# Patient Record
Sex: MALE | Race: WHITE | Employment: OTHER | URBAN - METROPOLITAN AREA
[De-identification: names, ages, dates, MRNs, and addresses within clinical notes are randomized per-mention and may not be internally consistent; named-entity substitution may affect disease eponyms.]

---

## 2017-09-28 ENCOUNTER — OFFICE VISIT (OUTPATIENT)
Dept: BEHAVIORAL/MENTAL HEALTH | Age: 47
End: 2017-09-28

## 2017-09-28 DIAGNOSIS — F32.A DEPRESSION, UNSPECIFIED DEPRESSION TYPE: ICD-10-CM

## 2017-09-28 DIAGNOSIS — F41.9 ANXIETY: ICD-10-CM

## 2017-09-28 PROCEDURE — 90791 PSYCH DIAGNOSTIC EVALUATION: CPT | Performed by: PSYCHOLOGIST

## 2017-09-28 ASSESSMENT — PATIENT HEALTH QUESTIONNAIRE - PHQ9
7. TROUBLE CONCENTRATING ON THINGS, SUCH AS READING THE NEWSPAPER OR WATCHING TELEVISION: 2
1. LITTLE INTEREST OR PLEASURE IN DOING THINGS: 1
3. TROUBLE FALLING OR STAYING ASLEEP: 3
8. MOVING OR SPEAKING SO SLOWLY THAT OTHER PEOPLE COULD HAVE NOTICED. OR THE OPPOSITE, BEING SO FIGETY OR RESTLESS THAT YOU HAVE BEEN MOVING AROUND A LOT MORE THAN USUAL: 3
9. THOUGHTS THAT YOU WOULD BE BETTER OFF DEAD, OR OF HURTING YOURSELF: 2
4. FEELING TIRED OR HAVING LITTLE ENERGY: 3
2. FEELING DOWN, DEPRESSED OR HOPELESS: 2
6. FEELING BAD ABOUT YOURSELF - OR THAT YOU ARE A FAILURE OR HAVE LET YOURSELF OR YOUR FAMILY DOWN: 2
10. IF YOU CHECKED OFF ANY PROBLEMS, HOW DIFFICULT HAVE THESE PROBLEMS MADE IT FOR YOU TO DO YOUR WORK, TAKE CARE OF THINGS AT HOME, OR GET ALONG WITH OTHER PEOPLE: 2
SUM OF ALL RESPONSES TO PHQ9 QUESTIONS 1 & 2: 3
5. POOR APPETITE OR OVEREATING: 2
SUM OF ALL RESPONSES TO PHQ QUESTIONS 1-9: 20

## 2017-10-10 ENCOUNTER — HOSPITAL ENCOUNTER (OUTPATIENT)
Dept: SLEEP CENTER | Age: 47
Discharge: HOME | End: 2017-10-10
Payer: COMMERCIAL

## 2017-10-10 PROCEDURE — 95811 POLYSOM 6/>YRS CPAP 4/> PARM: CPT

## 2019-05-24 ENCOUNTER — OFFICE VISIT (OUTPATIENT)
Dept: FAMILY MEDICINE CLINIC | Age: 49
End: 2019-05-24
Payer: COMMERCIAL

## 2019-05-24 VITALS
HEART RATE: 81 BPM | DIASTOLIC BLOOD PRESSURE: 78 MMHG | OXYGEN SATURATION: 96 % | HEIGHT: 70 IN | SYSTOLIC BLOOD PRESSURE: 130 MMHG | BODY MASS INDEX: 32.58 KG/M2 | WEIGHT: 227.6 LBS | TEMPERATURE: 97.3 F

## 2019-05-24 DIAGNOSIS — R73.9 HYPERGLYCEMIA: ICD-10-CM

## 2019-05-24 DIAGNOSIS — Z12.5 PROSTATE CANCER SCREENING: ICD-10-CM

## 2019-05-24 DIAGNOSIS — E03.9 HYPOTHYROIDISM, UNSPECIFIED TYPE: ICD-10-CM

## 2019-05-24 DIAGNOSIS — Z01.84 IMMUNITY STATUS TESTING: ICD-10-CM

## 2019-05-24 DIAGNOSIS — Z11.1 VISIT FOR TB SKIN TEST: ICD-10-CM

## 2019-05-24 DIAGNOSIS — Z00.00 WELLNESS EXAMINATION: Primary | ICD-10-CM

## 2019-05-24 DIAGNOSIS — Z00.00 PREVENTATIVE HEALTH CARE: ICD-10-CM

## 2019-05-24 DIAGNOSIS — Z23 NEED FOR TDAP VACCINATION: ICD-10-CM

## 2019-05-24 DIAGNOSIS — Z20.820 VARICELLA EXPOSURE: ICD-10-CM

## 2019-05-24 DIAGNOSIS — Z78.9 MEASLES, MUMPS, RUBELLA (MMR) VACCINATION STATUS UNKNOWN: ICD-10-CM

## 2019-05-24 LAB
ALBUMIN SERPL-MCNC: 4.7 G/DL (ref 3.5–4.6)
ALP BLD-CCNC: 54 U/L (ref 35–104)
ALT SERPL-CCNC: 24 U/L (ref 0–41)
ANION GAP SERPL CALCULATED.3IONS-SCNC: 14 MEQ/L (ref 9–15)
AST SERPL-CCNC: 35 U/L (ref 0–40)
BASOPHILS ABSOLUTE: 0 K/UL (ref 0–0.2)
BASOPHILS RELATIVE PERCENT: 0.4 %
BILIRUB SERPL-MCNC: 0.6 MG/DL (ref 0.2–0.7)
BUN BLDV-MCNC: 20 MG/DL (ref 6–20)
CALCIUM SERPL-MCNC: 9.6 MG/DL (ref 8.5–9.9)
CHLORIDE BLD-SCNC: 103 MEQ/L (ref 95–107)
CHOLESTEROL, TOTAL: 148 MG/DL (ref 0–199)
CO2: 25 MEQ/L (ref 20–31)
CREAT SERPL-MCNC: 1.29 MG/DL (ref 0.7–1.2)
EOSINOPHILS ABSOLUTE: 0 K/UL (ref 0–0.7)
EOSINOPHILS RELATIVE PERCENT: 0.8 %
GFR AFRICAN AMERICAN: >60
GFR NON-AFRICAN AMERICAN: 58.9
GLOBULIN: 2.9 G/DL (ref 2.3–3.5)
GLUCOSE BLD-MCNC: 94 MG/DL (ref 70–99)
HBA1C MFR BLD: 5.5 % (ref 4.8–5.9)
HBV SURFACE AB TITR SER: NORMAL MIU/ML
HCT VFR BLD CALC: 47.1 % (ref 42–52)
HDLC SERPL-MCNC: 48 MG/DL (ref 40–59)
HEMOGLOBIN: 16.2 G/DL (ref 14–18)
LDL CHOLESTEROL CALCULATED: 86 MG/DL (ref 0–129)
LYMPHOCYTES ABSOLUTE: 1.3 K/UL (ref 1–4.8)
LYMPHOCYTES RELATIVE PERCENT: 24 %
MCH RBC QN AUTO: 30.8 PG (ref 27–31.3)
MCHC RBC AUTO-ENTMCNC: 34.4 % (ref 33–37)
MCV RBC AUTO: 89.6 FL (ref 80–100)
MONOCYTES ABSOLUTE: 0.3 K/UL (ref 0.2–0.8)
MONOCYTES RELATIVE PERCENT: 6.5 %
NEUTROPHILS ABSOLUTE: 3.6 K/UL (ref 1.4–6.5)
NEUTROPHILS RELATIVE PERCENT: 68.3 %
PDW BLD-RTO: 13.6 % (ref 11.5–14.5)
PLATELET # BLD: 232 K/UL (ref 130–400)
POTASSIUM SERPL-SCNC: 4.5 MEQ/L (ref 3.4–4.9)
PROSTATE SPECIFIC ANTIGEN: 1.03 NG/ML (ref 0–3.89)
RBC # BLD: 5.26 M/UL (ref 4.7–6.1)
RUBELLA ANTIBODY IGG: 207.4 IU/ML
SODIUM BLD-SCNC: 142 MEQ/L (ref 135–144)
T3 TOTAL: 0.92 NG/ML (ref 0.8–2)
T4 FREE: 1.22 NG/DL (ref 0.84–1.68)
TOTAL PROTEIN: 7.6 G/DL (ref 6.3–8)
TRIGL SERPL-MCNC: 70 MG/DL (ref 0–150)
TSH SERPL DL<=0.05 MIU/L-ACNC: 1.49 UIU/ML (ref 0.44–3.86)
WBC # BLD: 5.2 K/UL (ref 4.8–10.8)

## 2019-05-24 PROCEDURE — 90471 IMMUNIZATION ADMIN: CPT | Performed by: NURSE PRACTITIONER

## 2019-05-24 PROCEDURE — 99396 PREV VISIT EST AGE 40-64: CPT | Performed by: NURSE PRACTITIONER

## 2019-05-24 PROCEDURE — 90715 TDAP VACCINE 7 YRS/> IM: CPT | Performed by: NURSE PRACTITIONER

## 2019-05-24 PROCEDURE — 86580 TB INTRADERMAL TEST: CPT | Performed by: NURSE PRACTITIONER

## 2019-05-24 RX ORDER — LEVOTHYROXINE SODIUM 0.12 MG/1
125 TABLET ORAL DAILY
Qty: 30 TABLET | Refills: 5 | Status: SHIPPED | OUTPATIENT
Start: 2019-05-24 | End: 2019-12-10 | Stop reason: SDUPTHER

## 2019-05-24 ASSESSMENT — ENCOUNTER SYMPTOMS
RHINORRHEA: 0
EYE REDNESS: 0
ABDOMINAL PAIN: 0
SORE THROAT: 0
NAUSEA: 0
SHORTNESS OF BREATH: 0
DIARRHEA: 0
COUGH: 0
TROUBLE SWALLOWING: 0
WHEEZING: 0
EYE ITCHING: 0
VOMITING: 0
BACK PAIN: 0
SINUS PRESSURE: 0
COLOR CHANGE: 0
CONSTIPATION: 0
EYE DISCHARGE: 0
CHEST TIGHTNESS: 0

## 2019-05-24 NOTE — PROGRESS NOTES
Subjective:      Patient ID: Nubia Calderon is a 47 y.o. male who presents today for:     Chief Complaint   Patient presents with    Medication Refill     Pt needs refill of Synthroid. 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 HPI Pt in today for physical for school and refill on synthroid medication. He reports that he has been on the same dose of synthroid for years and has been stable. It has been a year since his previous labs and he will need some done. He denies any constipation, fatigue, brittle nails, tremors, palpitations, etc. He has been out of medication for about 3 days though. He denies any other chronic conditions such as heart, lungs, or kidney. He denies any cp, palpitations, sob, dizziness, syncope, etc.     No past medical history on file. No past surgical history on file. No family history on file.   Social History     Socioeconomic History    Marital status:      Spouse name: Not on file    Number of children: Not on file    Years of education: Not on file    Highest education level: Not on file   Occupational History    Not on file   Social Needs    Financial resource strain: Not on file    Food insecurity:     Worry: Not on file     Inability: Not on file    Transportation needs:     Medical: Not on file     Non-medical: Not on file   Tobacco Use    Smoking status: Never Smoker    Smokeless tobacco: Current User   Substance and Sexual Activity    Alcohol use: Not on file    Drug use: Not on file    Sexual activity: Not on file   Lifestyle    Physical activity:     Days per week: Not on file     Minutes per session: Not on file    Stress: Not on file   Relationships    Social connections:     Talks on phone: Not on file     Gets together: Not on file     Attends Restorationist service: Not on file     Active member of club or organization: Not on file     Attends meetings of clubs or organizations: Not on file Relationship status: Not on file    Intimate partner violence:     Fear of current or ex partner: Not on file     Emotionally abused: Not on file     Physically abused: Not on file     Forced sexual activity: Not on file   Other Topics Concern    Not on file   Social History Narrative    Not on file     No current outpatient medications on file prior to visit. No current facility-administered medications on file prior to visit. Allergies:  Patient has no known allergies. Review of Systems   Constitutional: Negative for appetite change, chills, fatigue and fever. HENT: Negative for congestion, ear pain, hearing loss, postnasal drip, rhinorrhea, sinus pressure, sneezing, sore throat and trouble swallowing. Eyes: Negative for discharge, redness and itching. Respiratory: Negative for cough, chest tightness, shortness of breath and wheezing. Cardiovascular: Negative for chest pain and palpitations. Gastrointestinal: Negative for abdominal pain, constipation, diarrhea, nausea and vomiting. Genitourinary: Negative for difficulty urinating, dysuria and urgency. Musculoskeletal: Negative for back pain, gait problem and joint swelling. Skin: Negative for color change, pallor and rash. Neurological: Negative for dizziness, tremors, seizures, syncope, speech difficulty, light-headedness and headaches. Hematological: Negative for adenopathy. Objective:   /78 (Site: Right Upper Arm, Position: Sitting, Cuff Size: Medium Adult)   Pulse 81   Temp 97.3 °F (36.3 °C) (Temporal)   Ht 5' 9.5\" (1.765 m)   Wt 227 lb 9.6 oz (103.2 kg)   SpO2 96%   BMI 33.13 kg/m²     Physical Exam   Constitutional: He is oriented to person, place, and time. He appears well-developed and well-nourished. HENT:   Head: Normocephalic and atraumatic.    Right Ear: Tympanic membrane, external ear and ear canal normal.   Left Ear: Tympanic membrane, external ear and ear canal normal.   Nose: Nose normal. Mouth/Throat: Uvula is midline, oropharynx is clear and moist and mucous membranes are normal.   Eyes: Pupils are equal, round, and reactive to light. Conjunctivae are normal. Right eye exhibits no discharge. Left eye exhibits no discharge. Neck: Normal range of motion. Neck supple. Carotid bruit is not present. No thyroid mass and no thyromegaly present. Cardiovascular: Normal rate, regular rhythm and normal heart sounds. Exam reveals no gallop and no friction rub. No murmur heard. Pulmonary/Chest: Effort normal and breath sounds normal. No respiratory distress. Abdominal: Soft. Bowel sounds are normal. There is no tenderness. Musculoskeletal: Normal range of motion. Lymphadenopathy:     He has no cervical adenopathy. Neurological: He is alert and oriented to person, place, and time. He has normal strength and normal reflexes. No cranial nerve deficit. Reflex Scores:       Patellar reflexes are 2+ on the right side and 2+ on the left side. Skin: Skin is warm and dry. Psychiatric: He has a normal mood and affect. His speech is normal and behavior is normal.       Assessment:          Diagnosis Orders   1. Wellness examination     2. Hyperglycemia  Comprehensive Metabolic Panel    CBC Auto Differential    Hemoglobin A1C   3. Preventative health care  Comprehensive Metabolic Panel    CBC Auto Differential    Lipid Panel   4. Hypothyroidism, unspecified type  levothyroxine (SYNTHROID) 125 MCG tablet    TSH without Reflex    T4, Free    T3   5. Prostate cancer screening  Psa screening   6. Measles, mumps, rubella (MMR) vaccination status unknown  Rubella antibody, IgG   7. Varicella exposure  Varicella Zoster Antibody, IgG   8. Immunity status testing     9. Need for Tdap vaccination  Tdap (age 6y and older)  ("[employer] Extension)   8.  Visit for TB skin test  Mantoux testing       Plan:      Orders Placed This Encounter   Procedures    Tdap (age 6y and older)  ("[employer] Extension)    Mantoux testing    Comprehensive Metabolic Panel     Standing Status:   Future     Number of Occurrences:   1     Standing Expiration Date:   5/23/2020    CBC Auto Differential     Standing Status:   Future     Number of Occurrences:   1     Standing Expiration Date:   5/23/2020    TSH without Reflex     Standing Status:   Future     Number of Occurrences:   1     Standing Expiration Date:   5/23/2020    T4, Free     Standing Status:   Future     Number of Occurrences:   1     Standing Expiration Date:   5/24/2020    T3     Standing Status:   Future     Number of Occurrences:   1     Standing Expiration Date:   5/24/2020    Hemoglobin A1C     Standing Status:   Future     Number of Occurrences:   1     Standing Expiration Date:   5/23/2020    Lipid Panel     Standing Status:   Future     Number of Occurrences:   1     Standing Expiration Date:   5/23/2020     Order Specific Question:   Is Patient Fasting?/# of Hours     Answer:   10    Psa screening     Standing Status:   Future     Number of Occurrences:   1     Standing Expiration Date:   5/24/2020    Rubella antibody, IgG     Standing Status:   Future     Number of Occurrences:   1     Standing Expiration Date:   5/23/2020    Varicella Zoster Antibody, IgG     Standing Status:   Future     Number of Occurrences:   1     Standing Expiration Date:   5/23/2020          Orders Placed This Encounter   Medications    levothyroxine (SYNTHROID) 125 MCG tablet     Sig: Take 1 tablet by mouth Daily     Dispense:  30 tablet     Refill:  5       Return in about 6 months (around 11/24/2019). Preventative- lab work. F/u pending results. Vaccination status- titers  Hyperglycemia- check hgb a1c as his last few BG have been elevated. Hypothyroidism- recheck tsh and continue current synthroid dose. Reviewed with the patient: current clinicalstatus, medications, activities and diet.      Side effects, adverse effects of the medication prescribedtoday, as well as treatment plan/ rationale and result expectations have been discussedwith the patient who expresses understanding and desires to proceed. Close follow upto evaluate treatment results and for coordination of care. I have reviewedthe patient's medical history in detail and updated the computerized patient record.     Reji Levine, APRN - CNP

## 2019-05-24 NOTE — PATIENT INSTRUCTIONS
Patient Education        Hypothyroidism: Care Instructions  Your Care Instructions    You have hypothyroidism, which means that your body is not making enough thyroid hormone. This hormone helps your body use energy. If your thyroid level is low, you may feel tired, be constipated, have an increase in your blood pressure, or have dry skin or memory problems. You may also get cold easily, even when it is warm. Women with low thyroid levels may have heavy menstrual periods. A blood test to find your thyroid-stimulating hormone (TSH) level is used to check for hypothyroidism. A high TSH level may mean that you have low thyroid. When your body is not making enough thyroid hormone, TSH levels rise in an effort to make the body produce more. The treatment for hypothyroidism is to take thyroid hormone pills. You should start to feel better in 1 to 2 weeks. But it can take several months to see changes in the TSH level. You will need regular visits with your doctor to make sure you have the right dose of medicine. Most people need treatment for the rest of their lives. You will need to see your doctor regularly to have blood tests and to make sure you are doing well. Follow-up care is a key part of your treatment and safety. Be sure to make and go to all appointments, and call your doctor if you are having problems. It's also a good idea to know your test results and keep a list of the medicines you take. How can you care for yourself at home? · Take your thyroid hormone medicine exactly as prescribed. Call your doctor if you think you are having a problem with your medicine. Most people do not have side effects if they take the right amount of medicine regularly. ? Take the medicine 30 minutes before breakfast, and do not take it with calcium, vitamins, or iron. ? Do not take extra doses of your thyroid medicine. It will not help you get better any faster, and it may cause side effects.   ? If you forget to take a dose, do NOT take a double dose of medicine. Take your usual dose the next day. · Tell your doctor about all prescription, herbal, or over-the-counter products you take. · Take care of yourself. Eat a healthy diet, get enough sleep, and get regular exercise. When should you call for help? Call 911 anytime you think you may need emergency care. For example, call if:    · You passed out (lost consciousness).     · You have severe trouble breathing.     · You have a very slow heartbeat (less than 60 beats a minute).     · You have a low body temperature (95°F or below).    Call your doctor now or seek immediate medical care if:    · You feel tired, sluggish, or weak.     · You have trouble remembering things or concentrating.     · You do not begin to feel better 2 weeks after starting your medicine.    Watch closely for changes in your health, and be sure to contact your doctor if you have any problems. Where can you learn more? Go to https://lynda.com.[employer]. org and sign in to your [employer] account. Enter V280 in the [employer] box to learn more about \"Hypothyroidism: Care Instructions. \"     If you do not have an account, please click on the \"Sign Up Now\" link. Current as of: November 6, 2018  Content Version: 12.0  © 3482-7841 [employer], Inc[employer]. Care instructions adapted under license by [facility] ([facility]). If you have questions about a medical condition or this instruction, always ask your healthcare professional. [place] [address] any warranty or liability for your use of this information.

## 2019-05-27 LAB
MUV IGG SER QL: 178 AU/ML
RUBEOLA (MEASLES) AB IGG: 284 AU/ML
VZV IGG SER QL IA: 1304 IV

## 2019-05-28 ENCOUNTER — TELEPHONE (OUTPATIENT)
Dept: FAMILY MEDICINE CLINIC | Age: 49
End: 2019-05-28

## 2019-05-28 ENCOUNTER — NURSE ONLY (OUTPATIENT)
Dept: FAMILY MEDICINE CLINIC | Age: 49
End: 2019-05-28
Payer: COMMERCIAL

## 2019-05-28 DIAGNOSIS — Z23 NEED FOR HEPATITIS B VACCINATION: Primary | ICD-10-CM

## 2019-05-28 PROCEDURE — 90471 IMMUNIZATION ADMIN: CPT | Performed by: NURSE PRACTITIONER

## 2019-05-28 PROCEDURE — 90746 HEPB VACCINE 3 DOSE ADULT IM: CPT | Performed by: NURSE PRACTITIONER

## 2019-05-28 NOTE — TELEPHONE ENCOUNTER
Spoke to pt can not have one for 7 days he is aware of this how ever went to [facility] and had done. He is aware this can cause fasle positive he will be in for hep b shot.

## 2019-05-29 LAB
ALBUMIN SERPL-MCNC: 4.5 G/DL (ref 3.5–4.6)
ALP BLD-CCNC: 62 U/L (ref 35–104)
ALT SERPL-CCNC: 32 U/L (ref 0–41)
AST SERPL-CCNC: 40 U/L (ref 0–40)
BILIRUB SERPL-MCNC: 0.3 MG/DL (ref 0.2–0.7)
BILIRUBIN DIRECT: <0.2 MG/DL (ref 0–0.4)
BILIRUBIN, INDIRECT: NORMAL MG/DL (ref 0–0.6)
TOTAL PROTEIN: 7.6 G/DL (ref 6.3–8)

## 2019-07-12 ENCOUNTER — OFFICE VISIT (OUTPATIENT)
Dept: FAMILY MEDICINE CLINIC | Age: 49
End: 2019-07-12
Payer: COMMERCIAL

## 2019-07-12 VITALS
WEIGHT: 226.2 LBS | OXYGEN SATURATION: 96 % | HEART RATE: 81 BPM | DIASTOLIC BLOOD PRESSURE: 72 MMHG | HEIGHT: 70 IN | TEMPERATURE: 97.1 F | SYSTOLIC BLOOD PRESSURE: 126 MMHG | BODY MASS INDEX: 32.38 KG/M2

## 2019-07-12 DIAGNOSIS — R79.89 ELEVATED SERUM CREATININE: ICD-10-CM

## 2019-07-12 DIAGNOSIS — Z23 NEED FOR HEPATITIS B VACCINATION: ICD-10-CM

## 2019-07-12 DIAGNOSIS — Z12.11 COLON CANCER SCREENING: ICD-10-CM

## 2019-07-12 DIAGNOSIS — R79.89 ELEVATED SERUM CREATININE: Primary | ICD-10-CM

## 2019-07-12 DIAGNOSIS — R37 SEXUAL DYSFUNCTION: ICD-10-CM

## 2019-07-12 DIAGNOSIS — F41.9 ANXIETY: ICD-10-CM

## 2019-07-12 DIAGNOSIS — R00.2 PALPITATIONS: ICD-10-CM

## 2019-07-12 LAB
ALBUMIN SERPL-MCNC: 4.9 G/DL (ref 3.5–4.6)
ALP BLD-CCNC: 64 U/L (ref 35–104)
ALT SERPL-CCNC: 34 U/L (ref 0–41)
ANION GAP SERPL CALCULATED.3IONS-SCNC: 16 MEQ/L (ref 9–15)
AST SERPL-CCNC: 36 U/L (ref 0–40)
BILIRUB SERPL-MCNC: 0.7 MG/DL (ref 0.2–0.7)
BUN BLDV-MCNC: 20 MG/DL (ref 6–20)
CALCIUM SERPL-MCNC: 10.3 MG/DL (ref 8.5–9.9)
CHLORIDE BLD-SCNC: 100 MEQ/L (ref 95–107)
CO2: 26 MEQ/L (ref 20–31)
CREAT SERPL-MCNC: 1.2 MG/DL (ref 0.7–1.2)
GFR AFRICAN AMERICAN: >60
GFR NON-AFRICAN AMERICAN: >60
GLOBULIN: 3.3 G/DL (ref 2.3–3.5)
GLUCOSE BLD-MCNC: 76 MG/DL (ref 70–99)
POTASSIUM SERPL-SCNC: 4.7 MEQ/L (ref 3.4–4.9)
SODIUM BLD-SCNC: 142 MEQ/L (ref 135–144)
TOTAL PROTEIN: 8.2 G/DL (ref 6.3–8)

## 2019-07-12 PROCEDURE — G8427 DOCREV CUR MEDS BY ELIG CLIN: HCPCS | Performed by: NURSE PRACTITIONER

## 2019-07-12 PROCEDURE — 99214 OFFICE O/P EST MOD 30 MIN: CPT | Performed by: NURSE PRACTITIONER

## 2019-07-12 PROCEDURE — 3017F COLORECTAL CA SCREEN DOC REV: CPT | Performed by: NURSE PRACTITIONER

## 2019-07-12 PROCEDURE — 90746 HEPB VACCINE 3 DOSE ADULT IM: CPT | Performed by: NURSE PRACTITIONER

## 2019-07-12 PROCEDURE — 4004F PT TOBACCO SCREEN RCVD TLK: CPT | Performed by: NURSE PRACTITIONER

## 2019-07-12 PROCEDURE — 90471 IMMUNIZATION ADMIN: CPT | Performed by: NURSE PRACTITIONER

## 2019-07-12 PROCEDURE — G8417 CALC BMI ABV UP PARAM F/U: HCPCS | Performed by: NURSE PRACTITIONER

## 2019-07-12 NOTE — PROGRESS NOTES
 Drug use: Not on file    Sexual activity: Not on file   Lifestyle    Physical activity:     Days per week: Not on file     Minutes per session: Not on file    Stress: Not on file   Relationships    Social connections:     Talks on phone: Not on file     Gets together: Not on file     Attends Scientology service: Not on file     Active member of club or organization: Not on file     Attends meetings of clubs or organizations: Not on file     Relationship status: Not on file    Intimate partner violence:     Fear of current or ex partner: Not on file     Emotionally abused: Not on file     Physically abused: Not on file     Forced sexual activity: Not on file   Other Topics Concern    Not on file   Social History Narrative    Not on file     Current Outpatient Medications on File Prior to Visit   Medication Sig Dispense Refill    levothyroxine (SYNTHROID) 125 MCG tablet Take 1 tablet by mouth Daily 30 tablet 5     No current facility-administered medications on file prior to visit. Allergies:  Patient has no known allergies. Review of Systems   Constitutional: Negative for chills, fatigue and fever. Respiratory: Negative for cough, shortness of breath and wheezing. Cardiovascular: Positive for palpitations. Negative for chest pain and leg swelling. Gastrointestinal: Negative for abdominal pain. Genitourinary: Negative for discharge, enuresis, flank pain, frequency, genital sores, penile pain, penile swelling, scrotal swelling and testicular pain. Psychiatric/Behavioral: Negative for self-injury, sleep disturbance and suicidal ideas. The patient is nervous/anxious. Objective:   /72 (Site: Left Upper Arm, Position: Sitting, Cuff Size: Large Adult)   Pulse 81   Temp 97.1 °F (36.2 °C) (Temporal)   Ht 5' 9.5\" (1.765 m)   Wt 226 lb 3.2 oz (102.6 kg)   SpO2 96%   BMI 32.92 kg/m²     Physical Exam   Constitutional: He is oriented to person, place, and time.  He appears Standing Status:   Future     Number of Occurrences:   1     Standing Expiration Date:   7/11/2020    Testosterone Free And Total Male     Standing Status:   Future     Number of Occurrences:   1     Standing Expiration Date:   7/12/2020        No orders of the defined types were placed in this encounter. Return in about 4 months (around 11/12/2019). Colon cancer screening- cologuard  Sexual dysfunction- testosterone lab  Elevated creatinine- hydrate and recheck cmp  Palpitations- start with stress test. F/u pending results and if it continues. Consider cardio referral next. Anxiety- Pt has need for specialized testing environment because of anxiety related to past experiences that has mostly resolved, but has been increasing lately with going back to school. Reviewed with the patient: current clinicalstatus, medications, activities and diet. Side effects, adverse effects of the medication prescribedtoday, as well as treatment plan/ rationale and result expectations have been discussedwith the patient who expresses understanding and desires to proceed. Close follow upto evaluate treatment results and for coordination of care. I have reviewedthe patient's medical history in detail and updated the computerized patient record.     Henny Michelle, VICKIE - CNP

## 2019-07-12 NOTE — PATIENT INSTRUCTIONS
also may want to do other activities, such as running, swimming, cycling, or playing tennis or team sports. ? Go to bed at nearly the same time every night. And keep your room quiet and dark. This will reduce distractions and help you get a good night's rest.  ? Eat a balanced diet by choosing foods low in fat and high in fiber. ? Avoid food and drinks that contain caffeine, such as chocolate and coffee. Caffeine may make your phobia worse. ? Try some relaxation exercises. Certain breathing exercises and muscle relaxation exercises help reduce anxiety. · Stay active. Try to do the things you usually enjoy doing, even if you don't feel like doing them. · Discuss the cause of your fears with a good friend or family member. Or join a support group for people with problems like yours. Sharing feelings with others sometimes relieves fear. · When you start to feel fearful, do something to get your mind off it, such as taking a walk. · Trust that you can improve your way of coping with these fears. You can feel better. What should you do if someone in your family has a phobia? · Learn about the phobia and signs that symptoms are getting worse. · Remind your family member of your love. Speak honestly with him or her. · Make a plan with all family members about how to take care of your loved one when his or her fears are bad. Talk about your concerns and those of other family members. · Do not focus attention only on the family member who is in treatment. · Remind yourself that it will take time for changes to occur. · Do not blame yourself for his or her condition. · Know your legal rights and the legal rights of your family member. · Take care of yourself. Stay involved with your own interests, such as your career, hobbies, and friends. · Use exercise, positive self-talk, relaxation, and deep breathing exercises to help lower your stress.   · If you are having a hard time with your feelings and your

## 2019-07-15 LAB
SEX HORMONE BINDING GLOBULIN: 43 NMOL/L (ref 11–80)
TESTOSTERONE FREE PERCENT: 1.6 % (ref 1.6–2.9)
TESTOSTERONE FREE, CALC: 65 PG/ML (ref 47–244)
TESTOSTERONE TOTAL-MALE: 411 NG/DL (ref 300–890)

## 2019-07-22 ASSESSMENT — ENCOUNTER SYMPTOMS
WHEEZING: 0
COUGH: 0
SHORTNESS OF BREATH: 0
ABDOMINAL PAIN: 0

## 2019-08-15 ENCOUNTER — OFFICE VISIT (OUTPATIENT)
Dept: FAMILY MEDICINE CLINIC | Age: 49
End: 2019-08-15
Payer: COMMERCIAL

## 2019-08-15 VITALS
HEART RATE: 80 BPM | DIASTOLIC BLOOD PRESSURE: 76 MMHG | OXYGEN SATURATION: 97 % | WEIGHT: 228.6 LBS | SYSTOLIC BLOOD PRESSURE: 130 MMHG | TEMPERATURE: 97 F | HEIGHT: 70 IN | BODY MASS INDEX: 32.73 KG/M2

## 2019-08-15 DIAGNOSIS — R53.83 FATIGUE, UNSPECIFIED TYPE: Primary | ICD-10-CM

## 2019-08-15 DIAGNOSIS — R68.82 LOW LIBIDO: ICD-10-CM

## 2019-08-15 DIAGNOSIS — E29.1 HYPOTESTOSTERONEMIA IN MALE: ICD-10-CM

## 2019-08-15 PROBLEM — E03.9 HYPOTHYROIDISM: Status: ACTIVE | Noted: 2019-08-15

## 2019-08-15 PROCEDURE — G8427 DOCREV CUR MEDS BY ELIG CLIN: HCPCS | Performed by: NURSE PRACTITIONER

## 2019-08-15 PROCEDURE — 4004F PT TOBACCO SCREEN RCVD TLK: CPT | Performed by: NURSE PRACTITIONER

## 2019-08-15 PROCEDURE — G8417 CALC BMI ABV UP PARAM F/U: HCPCS | Performed by: NURSE PRACTITIONER

## 2019-08-15 PROCEDURE — 99213 OFFICE O/P EST LOW 20 MIN: CPT | Performed by: NURSE PRACTITIONER

## 2019-08-15 PROCEDURE — 3017F COLORECTAL CA SCREEN DOC REV: CPT | Performed by: NURSE PRACTITIONER

## 2019-08-15 RX ORDER — TESTOSTERONE CYPIONATE 200 MG/ML
200 INJECTION INTRAMUSCULAR ONCE
Qty: 1 ML | Refills: 0 | Status: SHIPPED | OUTPATIENT
Start: 2019-08-15 | End: 2019-09-02 | Stop reason: SDUPTHER

## 2019-08-15 ASSESSMENT — ENCOUNTER SYMPTOMS
WHEEZING: 0
COUGH: 0
SHORTNESS OF BREATH: 0

## 2019-08-15 NOTE — PATIENT INSTRUCTIONS
possible side effects of testosterone injection? Get emergency medical help if you have signs of an allergic reaction:  hives; difficult breathing; swelling of your face, lips, tongue, or throat. Call your doctor at once if you have:  · nausea or vomiting;  · changes in skin color;  · increased or ongoing erection of the penis;  · impotence, ejaculation problems, decreased amounts of semen, decrease in testicle size;  · painful or difficult urination;  · shortness of breath (even with mild exertion);  · chest pain or pressure, pain spreading to your jaw or shoulder;  · swelling in your ankles or feet, rapid weight gain;  · signs of a blood clot in the lung --chest pain, sudden cough, wheezing, rapid breathing, coughing up blood;  · signs of a blood clot in your leg --pain, swelling, warmth, or redness in one or both legs;  · high levels of calcium in the blood --stomach pain, constipation, increased thirst or urination, muscle pain or weakness, joint pain, confusion, and feeling tired or restless; or  · liver problems --upper stomach pain, itching, loss of appetite, dark urine, amara-colored stools, jaundice (yellowing of the skin or eyes). Women receiving testosterone may develop male characteristics, which could be irreversible if treatment is continued. Call your doctor at once if you notice any of these signs of excess testosterone:  · acne;  · changes in menstrual periods;  · male-pattern hair growth (such as on the chin or chest);  · hoarse or deepened voice; or  · enlarged clitoris. Common side effects (in men or women) may include:  · breast swelling;  · headache, anxiety;  · increased facial or body hair growth, male-pattern baldness;  · increased or decreased interest in sex;  · numbness or tingly feeling; or  · pain or swelling where the medicine was injected. This is not a complete list of side effects and others may occur. Call your doctor for medical advice about side effects.  You may report side

## 2019-08-15 NOTE — PROGRESS NOTES
injection   2. Low libido  testosterone cypionate (DEPOTESTOTERONE CYPIONATE) 200 MG/ML injection   3. Hypotestosteronemia in male  testosterone cypionate (DEPOTESTOTERONE CYPIONATE) 200 MG/ML injection       Plan:      No orders of the defined types were placed in this encounter. Orders Placed This Encounter   Medications    testosterone cypionate (DEPOTESTOTERONE CYPIONATE) 200 MG/ML injection     Sig: Inject 1 mL into the muscle once for 1 dose. Dispense:  1 mL     Refill:  0       Return in about 3 months (around 11/15/2019). Discussed with patient that due to testosterone being low normal insurance may not cover medication. He verbalized understanding and would still like to start medication. 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 He was agreeable. Will start at 200mg every 3 weeks and retest between the second and third dose. Reviewed with the patient: current clinicalstatus, medications, activities and diet. Side effects, adverse effects of the medication prescribedtoday, as well as treatment plan/ rationale and result expectations have been discussedwith the patient who expresses understanding and desires to proceed. Close follow upto evaluate treatment results and for coordination of care. I have reviewedthe patient's medical history in detail and updated the computerized patient record.     VICKIE Das Cera - CNP

## 2019-09-02 DIAGNOSIS — E29.1 HYPOTESTOSTERONEMIA IN MALE: ICD-10-CM

## 2019-09-02 DIAGNOSIS — R68.82 LOW LIBIDO: ICD-10-CM

## 2019-09-02 DIAGNOSIS — R53.83 FATIGUE, UNSPECIFIED TYPE: ICD-10-CM

## 2019-09-03 RX ORDER — TESTOSTERONE CYPIONATE 200 MG/ML
INJECTION INTRAMUSCULAR
Qty: 1 ML | Refills: 0 | Status: SHIPPED | OUTPATIENT
Start: 2019-09-03 | End: 2019-09-28 | Stop reason: SDUPTHER

## 2019-09-27 DIAGNOSIS — E03.9 HYPOTHYROIDISM, UNSPECIFIED TYPE: ICD-10-CM

## 2019-09-27 DIAGNOSIS — E29.1 HYPOTESTOSTERONEMIA IN MALE: ICD-10-CM

## 2019-09-27 DIAGNOSIS — R53.83 FATIGUE, UNSPECIFIED TYPE: ICD-10-CM

## 2019-09-27 DIAGNOSIS — R68.82 LOW LIBIDO: ICD-10-CM

## 2019-09-27 RX ORDER — TESTOSTERONE CYPIONATE 200 MG/ML
INJECTION INTRAMUSCULAR
Qty: 1 ML | Refills: 0 | Status: CANCELLED | OUTPATIENT
Start: 2019-09-27 | End: 2019-10-18

## 2019-09-27 RX ORDER — LEVOTHYROXINE SODIUM 0.12 MG/1
125 TABLET ORAL DAILY
Qty: 30 TABLET | Refills: 5 | Status: CANCELLED | OUTPATIENT
Start: 2019-09-27

## 2019-09-28 DIAGNOSIS — E29.1 HYPOTESTOSTERONEMIA IN MALE: ICD-10-CM

## 2019-09-28 DIAGNOSIS — R68.82 LOW LIBIDO: ICD-10-CM

## 2019-09-28 DIAGNOSIS — R53.83 FATIGUE, UNSPECIFIED TYPE: ICD-10-CM

## 2019-09-30 DIAGNOSIS — E29.1 HYPOTESTOSTERONEMIA IN MALE: Primary | ICD-10-CM

## 2019-10-01 DIAGNOSIS — E29.1 HYPOTESTOSTERONEMIA IN MALE: ICD-10-CM

## 2019-10-01 RX ORDER — TESTOSTERONE CYPIONATE 200 MG/ML
INJECTION INTRAMUSCULAR
Qty: 1 ML | Refills: 0 | Status: SHIPPED | OUTPATIENT
Start: 2019-10-01 | End: 2019-10-17 | Stop reason: SDUPTHER

## 2019-10-03 LAB
SEX HORMONE BINDING GLOBULIN: 34 NMOL/L (ref 11–80)
TESTOSTERONE FREE PERCENT: 1.8 % (ref 1.6–2.9)
TESTOSTERONE FREE, CALC: 72 PG/ML (ref 47–244)
TESTOSTERONE TOTAL-MALE: 399 NG/DL (ref 300–890)

## 2019-10-15 ENCOUNTER — NURSE ONLY (OUTPATIENT)
Dept: FAMILY MEDICINE CLINIC | Age: 49
End: 2019-10-15
Payer: COMMERCIAL

## 2019-10-15 DIAGNOSIS — Z23 NEED FOR INFLUENZA VACCINATION: Primary | ICD-10-CM

## 2019-10-15 PROCEDURE — 90688 IIV4 VACCINE SPLT 0.5 ML IM: CPT | Performed by: NURSE PRACTITIONER

## 2019-10-15 PROCEDURE — 90471 IMMUNIZATION ADMIN: CPT | Performed by: NURSE PRACTITIONER

## 2019-10-17 DIAGNOSIS — R68.82 LOW LIBIDO: ICD-10-CM

## 2019-10-17 DIAGNOSIS — R53.83 FATIGUE, UNSPECIFIED TYPE: ICD-10-CM

## 2019-10-17 DIAGNOSIS — E29.1 HYPOTESTOSTERONEMIA IN MALE: ICD-10-CM

## 2019-10-17 DIAGNOSIS — M25.50 ARTHRALGIA, UNSPECIFIED JOINT: Primary | ICD-10-CM

## 2019-10-17 RX ORDER — TESTOSTERONE CYPIONATE 200 MG/ML
INJECTION INTRAMUSCULAR
Qty: 1 ML | Refills: 2 | Status: SHIPPED | OUTPATIENT
Start: 2019-10-17 | End: 2019-11-25 | Stop reason: SDUPTHER

## 2019-10-17 RX ORDER — IBUPROFEN AND FAMOTIDINE 26.6; 8 MG/1; MG/1
1 TABLET, FILM COATED ORAL 3 TIMES DAILY
Qty: 90 TABLET | Refills: 0 | Status: SHIPPED | OUTPATIENT
Start: 2019-10-17 | End: 2019-12-10 | Stop reason: SDUPTHER

## 2019-11-20 ENCOUNTER — PATIENT MESSAGE (OUTPATIENT)
Dept: FAMILY MEDICINE CLINIC | Age: 49
End: 2019-11-20

## 2019-11-20 DIAGNOSIS — E29.1 HYPOTESTOSTERONEMIA IN MALE: ICD-10-CM

## 2019-11-20 DIAGNOSIS — R68.82 LOW LIBIDO: ICD-10-CM

## 2019-11-20 DIAGNOSIS — R53.83 FATIGUE, UNSPECIFIED TYPE: ICD-10-CM

## 2019-11-25 RX ORDER — TESTOSTERONE CYPIONATE 200 MG/ML
INJECTION INTRAMUSCULAR
Qty: 2 ML | Refills: 5 | Status: SHIPPED | OUTPATIENT
Start: 2019-11-25 | End: 2020-06-08

## 2019-12-10 DIAGNOSIS — M25.50 ARTHRALGIA, UNSPECIFIED JOINT: ICD-10-CM

## 2019-12-10 DIAGNOSIS — E03.9 HYPOTHYROIDISM, UNSPECIFIED TYPE: ICD-10-CM

## 2019-12-10 RX ORDER — IBUPROFEN AND FAMOTIDINE 26.6; 8 MG/1; MG/1
1 TABLET, FILM COATED ORAL 3 TIMES DAILY
Qty: 90 TABLET | Refills: 0 | Status: SHIPPED | OUTPATIENT
Start: 2019-12-10 | End: 2020-02-07 | Stop reason: SDUPTHER

## 2019-12-10 RX ORDER — LEVOTHYROXINE SODIUM 0.12 MG/1
125 TABLET ORAL DAILY
Qty: 30 TABLET | Refills: 5 | Status: SHIPPED | OUTPATIENT
Start: 2019-12-10 | End: 2020-06-07 | Stop reason: SDUPTHER

## 2019-12-24 ENCOUNTER — NURSE ONLY (OUTPATIENT)
Dept: FAMILY MEDICINE CLINIC | Age: 49
End: 2019-12-24
Payer: COMMERCIAL

## 2019-12-24 DIAGNOSIS — Z23 NEED FOR HEPATITIS B VACCINATION: Primary | ICD-10-CM

## 2019-12-24 PROCEDURE — 90471 IMMUNIZATION ADMIN: CPT | Performed by: NURSE PRACTITIONER

## 2019-12-24 PROCEDURE — 90746 HEPB VACCINE 3 DOSE ADULT IM: CPT | Performed by: NURSE PRACTITIONER

## 2020-01-23 ENCOUNTER — PATIENT MESSAGE (OUTPATIENT)
Dept: FAMILY MEDICINE CLINIC | Age: 50
End: 2020-01-23

## 2020-01-24 RX ORDER — BUSPIRONE HYDROCHLORIDE 5 MG/1
5 TABLET ORAL 3 TIMES DAILY PRN
Qty: 90 TABLET | Refills: 1 | Status: SHIPPED | OUTPATIENT
Start: 2020-01-24 | End: 2020-12-11

## 2020-01-24 RX ORDER — PHENOL 1.4 %
1 AEROSOL, SPRAY (ML) MUCOUS MEMBRANE NIGHTLY PRN
Qty: 30 TABLET | Refills: 3 | Status: SHIPPED | OUTPATIENT
Start: 2020-01-24 | End: 2020-09-10

## 2020-01-24 NOTE — TELEPHONE ENCOUNTER
From: Kodi Zee  To:  VICKIE Brewer - CNP  Sent: 1/23/2020 10:52 AM EST  Subject: Prescription Question    Anxiety and sleep issues reminder

## 2020-02-04 RX ORDER — ESCITALOPRAM OXALATE 10 MG/1
10 TABLET ORAL DAILY
Qty: 30 TABLET | Refills: 5 | Status: SHIPPED | OUTPATIENT
Start: 2020-02-04 | End: 2020-12-11

## 2020-02-06 ENCOUNTER — APPOINTMENT (OUTPATIENT)
Dept: CT IMAGING | Age: 50
End: 2020-02-06
Payer: COMMERCIAL

## 2020-02-06 ENCOUNTER — HOSPITAL ENCOUNTER (EMERGENCY)
Age: 50
Discharge: HOME | End: 2020-02-06
Attending: EMERGENCY MEDICINE
Payer: COMMERCIAL

## 2020-02-06 VITALS
HEIGHT: 69 IN | WEIGHT: 230 LBS | BODY MASS INDEX: 34.07 KG/M2 | SYSTOLIC BLOOD PRESSURE: 170 MMHG | OXYGEN SATURATION: 95 % | DIASTOLIC BLOOD PRESSURE: 80 MMHG | TEMPERATURE: 99 F | RESPIRATION RATE: 17 BRPM | HEART RATE: 95 BPM

## 2020-02-06 PROCEDURE — 99284 EMERGENCY DEPT VISIT MOD MDM: CPT

## 2020-02-06 PROCEDURE — 70486 CT MAXILLOFACIAL W/O DYE: CPT

## 2020-02-06 PROCEDURE — 6360000002 HC RX W HCPCS: Performed by: EMERGENCY MEDICINE

## 2020-02-06 PROCEDURE — 70450 CT HEAD/BRAIN W/O DYE: CPT

## 2020-02-06 PROCEDURE — 96374 THER/PROPH/DIAG INJ IV PUSH: CPT

## 2020-02-06 PROCEDURE — 6370000000 HC RX 637 (ALT 250 FOR IP)

## 2020-02-06 PROCEDURE — 6370000000 HC RX 637 (ALT 250 FOR IP): Performed by: EMERGENCY MEDICINE

## 2020-02-06 RX ORDER — DIAPER,BRIEF,INFANT-TODD,DISP
EACH MISCELLANEOUS ONCE
Status: COMPLETED | OUTPATIENT
Start: 2020-02-06 | End: 2020-02-06

## 2020-02-06 RX ORDER — IBUPROFEN 400 MG/1
800 TABLET ORAL ONCE
Status: COMPLETED | OUTPATIENT
Start: 2020-02-06 | End: 2020-02-06

## 2020-02-06 RX ORDER — KETOROLAC TROMETHAMINE 30 MG/ML
60 INJECTION, SOLUTION INTRAMUSCULAR; INTRAVENOUS ONCE
Status: COMPLETED | OUTPATIENT
Start: 2020-02-06 | End: 2020-02-06

## 2020-02-06 RX ADMIN — BACITRACIN ZINC: 500 OINTMENT TOPICAL at 20:37

## 2020-02-06 RX ADMIN — KETOROLAC TROMETHAMINE 60 MG: 30 INJECTION, SOLUTION INTRAMUSCULAR at 20:52

## 2020-02-06 RX ADMIN — IBUPROFEN 800 MG: 400 TABLET, FILM COATED ORAL at 20:37

## 2020-02-06 RX ADMIN — Medication 1 EACH: at 20:52

## 2020-02-06 ASSESSMENT — PAIN SCALES - GENERAL
PAINLEVEL_OUTOF10: 4
PAINLEVEL_OUTOF10: 5
PAINLEVEL_OUTOF10: 5
PAINLEVEL_OUTOF10: 1

## 2020-02-06 ASSESSMENT — ENCOUNTER SYMPTOMS: FACIAL SWELLING: 1

## 2020-02-06 ASSESSMENT — VISUAL ACUITY
OU: 20/15
OS: 20/15
OD: 20/40

## 2020-02-06 ASSESSMENT — PAIN DESCRIPTION - ORIENTATION: ORIENTATION: RIGHT

## 2020-02-06 ASSESSMENT — PAIN DESCRIPTION - PAIN TYPE
TYPE: ACUTE PAIN

## 2020-02-06 ASSESSMENT — PAIN DESCRIPTION - LOCATION: LOCATION: FACE
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
## 2020-02-10 RX ORDER — IBUPROFEN AND FAMOTIDINE 26.6; 8 MG/1; MG/1
1 TABLET, FILM COATED ORAL 3 TIMES DAILY
Qty: 90 TABLET | Refills: 0 | Status: SHIPPED | OUTPATIENT
Start: 2020-02-10 | End: 2020-09-10

## 2020-02-10 NOTE — TELEPHONE ENCOUNTER
Rx requested:  Requested Prescriptions     Pending Prescriptions Disp Refills    ibuprofen-famotidine 800-26.6 MG TABS 90 tablet 0     Sig: Take 1 tablet by mouth 3 times daily nsaid + pepcid       Last Office Visit:   8/15/2019        Next Visit Date:  No future appointments.

## 2020-02-28 ENCOUNTER — PATIENT MESSAGE (OUTPATIENT)
Dept: FAMILY MEDICINE CLINIC | Age: 50
End: 2020-02-28

## 2020-02-28 NOTE — TELEPHONE ENCOUNTER
From: Jolynn Guallpa  To: VICKIE Aguiar - CNP  Sent: 2/28/2020 7:07 AM EST  Subject: Prescription Question    Good morning Bina. I think it's time for a T level check. Can you put the order in? Also, I used to pretty bad reflux at night and it's back. Have previously been on Omeprazole and it worked great. Can you renew that for me? It should be listed in my historical med list.     As always, you're a great PCP!

## 2020-03-02 RX ORDER — OMEPRAZOLE 40 MG/1
40 CAPSULE, DELAYED RELEASE ORAL DAILY
Qty: 30 CAPSULE | Refills: 3 | Status: SHIPPED | OUTPATIENT
Start: 2020-03-02

## 2020-03-13 DIAGNOSIS — E29.1 HYPOTESTOSTERONEMIA IN MALE: ICD-10-CM

## 2020-03-13 DIAGNOSIS — R53.83 FATIGUE, UNSPECIFIED TYPE: ICD-10-CM

## 2020-03-13 DIAGNOSIS — R68.82 LOW LIBIDO: ICD-10-CM

## 2020-03-15 LAB
SEX HORMONE BINDING GLOBULIN: 33 NMOL/L (ref 11–80)
TESTOSTERONE FREE PERCENT: ABNORMAL % (ref 1.6–2.9)
TESTOSTERONE FREE, CALC: ABNORMAL PG/ML (ref 47–244)
TESTOSTERONE TOTAL-MALE: >1500 NG/DL (ref 300–890)

## 2020-06-18 ENCOUNTER — PATIENT MESSAGE (OUTPATIENT)
Dept: FAMILY MEDICINE CLINIC | Age: 50
End: 2020-06-18

## 2020-06-19 RX ORDER — TESTOSTERONE CYPIONATE 200 MG/ML
200 INJECTION INTRAMUSCULAR
Qty: 2 ML | Refills: 1 | Status: SHIPPED | OUTPATIENT
Start: 2020-06-19 | End: 2020-09-21 | Stop reason: SDUPTHER

## 2020-06-22 DIAGNOSIS — E29.1 HYPOTESTOSTERONEMIA IN MALE: ICD-10-CM

## 2020-06-22 DIAGNOSIS — Z00.00 PREVENTATIVE HEALTH CARE: ICD-10-CM

## 2020-06-22 DIAGNOSIS — E03.9 HYPOTHYROIDISM, UNSPECIFIED TYPE: ICD-10-CM

## 2020-06-22 LAB
ALBUMIN SERPL-MCNC: 4.6 G/DL (ref 3.5–4.6)
ALP BLD-CCNC: 56 U/L (ref 35–104)
ALT SERPL-CCNC: 28 U/L (ref 0–41)
ANION GAP SERPL CALCULATED.3IONS-SCNC: 15 MEQ/L (ref 9–15)
AST SERPL-CCNC: 25 U/L (ref 0–40)
BASOPHILS ABSOLUTE: 0 K/UL (ref 0–0.2)
BASOPHILS RELATIVE PERCENT: 0.6 %
BILIRUB SERPL-MCNC: 0.6 MG/DL (ref 0.2–0.7)
BUN BLDV-MCNC: 18 MG/DL (ref 6–20)
CALCIUM SERPL-MCNC: 9.6 MG/DL (ref 8.5–9.9)
CHLORIDE BLD-SCNC: 103 MEQ/L (ref 95–107)
CHOLESTEROL, TOTAL: 184 MG/DL (ref 0–199)
CO2: 23 MEQ/L (ref 20–31)
CREAT SERPL-MCNC: 1.22 MG/DL (ref 0.7–1.2)
EOSINOPHILS ABSOLUTE: 0.1 K/UL (ref 0–0.7)
EOSINOPHILS RELATIVE PERCENT: 2.2 %
GFR AFRICAN AMERICAN: >60
GFR NON-AFRICAN AMERICAN: >60
GLOBULIN: 2.8 G/DL (ref 2.3–3.5)
GLUCOSE BLD-MCNC: 104 MG/DL (ref 70–99)
HCT VFR BLD CALC: 48 % (ref 42–52)
HDLC SERPL-MCNC: 52 MG/DL (ref 40–59)
HEMOGLOBIN: 16.1 G/DL (ref 14–18)
LDL CHOLESTEROL CALCULATED: 110 MG/DL (ref 0–129)
LYMPHOCYTES ABSOLUTE: 1.6 K/UL (ref 1–4.8)
LYMPHOCYTES RELATIVE PERCENT: 28.7 %
MCH RBC QN AUTO: 30.4 PG (ref 27–31.3)
MCHC RBC AUTO-ENTMCNC: 33.5 % (ref 33–37)
MCV RBC AUTO: 90.7 FL (ref 80–100)
MONOCYTES ABSOLUTE: 0.5 K/UL (ref 0.2–0.8)
MONOCYTES RELATIVE PERCENT: 8.6 %
NEUTROPHILS ABSOLUTE: 3.3 K/UL (ref 1.4–6.5)
NEUTROPHILS RELATIVE PERCENT: 59.9 %
PDW BLD-RTO: 13.4 % (ref 11.5–14.5)
PLATELET # BLD: 247 K/UL (ref 130–400)
POTASSIUM SERPL-SCNC: 4.6 MEQ/L (ref 3.4–4.9)
RBC # BLD: 5.29 M/UL (ref 4.7–6.1)
SODIUM BLD-SCNC: 141 MEQ/L (ref 135–144)
TOTAL PROTEIN: 7.4 G/DL (ref 6.3–8)
TRIGL SERPL-MCNC: 108 MG/DL (ref 0–150)
TSH SERPL DL<=0.05 MIU/L-ACNC: 0.97 UIU/ML (ref 0.44–3.86)
WBC # BLD: 5.6 K/UL (ref 4.8–10.8)

## 2020-06-23 LAB
SEX HORMONE BINDING GLOBULIN: 35 NMOL/L (ref 11–80)
TESTOSTERONE FREE PERCENT: 1.8 % (ref 1.6–2.9)
TESTOSTERONE FREE, CALC: 84 PG/ML (ref 47–244)
TESTOSTERONE TOTAL-MALE: 463 NG/DL (ref 300–890)

## 2020-07-24 RX ORDER — LEVOTHYROXINE SODIUM 0.12 MG/1
125 TABLET ORAL DAILY
Qty: 30 TABLET | Refills: 0 | Status: SHIPPED | OUTPATIENT
Start: 2020-07-24 | End: 2020-08-31 | Stop reason: SDUPTHER

## 2020-08-30 ENCOUNTER — PATIENT MESSAGE (OUTPATIENT)
Dept: FAMILY MEDICINE CLINIC | Age: 50
End: 2020-08-30

## 2020-08-31 RX ORDER — LEVOTHYROXINE SODIUM 0.12 MG/1
125 TABLET ORAL DAILY
Qty: 30 TABLET | Refills: 5 | Status: SHIPPED | OUTPATIENT
Start: 2020-08-31 | End: 2021-03-25

## 2020-08-31 NOTE — TELEPHONE ENCOUNTER
From: Nisa Doll  To: Kenneth Mobley, APRN - CNP  Sent: 8/30/2020 5:00 PM EDT  Subject: Prescription Question    I need my prescription for Synthroid (actual Synthroid) renewed? Why is it that it's so difficult to get this renewed constantly, especially when my last TSH was 6/22. Do I need to change providers for this? It's becoming a serious hassle to constantly message you about this.

## 2020-09-10 ENCOUNTER — NURSE ONLY (OUTPATIENT)
Dept: PRIMARY CARE CLINIC | Age: 50
End: 2020-09-10
Payer: COMMERCIAL

## 2020-09-10 VITALS
BODY MASS INDEX: 34.51 KG/M2 | DIASTOLIC BLOOD PRESSURE: 80 MMHG | WEIGHT: 233 LBS | SYSTOLIC BLOOD PRESSURE: 134 MMHG | HEIGHT: 69 IN | RESPIRATION RATE: 14 BRPM

## 2020-09-10 PROCEDURE — 99213 OFFICE O/P EST LOW 20 MIN: CPT | Performed by: INTERNAL MEDICINE

## 2020-09-10 RX ORDER — CYCLOBENZAPRINE HCL 10 MG
10 TABLET ORAL 3 TIMES DAILY PRN
Qty: 90 TABLET | Refills: 1 | Status: SHIPPED | OUTPATIENT
Start: 2020-09-10 | End: 2020-11-09

## 2020-09-10 RX ORDER — IBUPROFEN 800 MG/1
800 TABLET ORAL 2 TIMES DAILY PRN
Qty: 60 TABLET | Refills: 5 | Status: SHIPPED | OUTPATIENT
Start: 2020-09-10

## 2020-09-10 RX ORDER — METHYLPREDNISOLONE 4 MG/1
TABLET ORAL
Qty: 1 KIT | Refills: 1 | Status: SHIPPED | OUTPATIENT
Start: 2020-09-10 | End: 2020-09-16

## 2020-09-10 SDOH — ECONOMIC STABILITY: INCOME INSECURITY: HOW HARD IS IT FOR YOU TO PAY FOR THE VERY BASICS LIKE FOOD, HOUSING, MEDICAL CARE, AND HEATING?: NOT HARD AT ALL

## 2020-09-10 SDOH — ECONOMIC STABILITY: FOOD INSECURITY: WITHIN THE PAST 12 MONTHS, YOU WORRIED THAT YOUR FOOD WOULD RUN OUT BEFORE YOU GOT MONEY TO BUY MORE.: NEVER TRUE

## 2020-09-10 SDOH — ECONOMIC STABILITY: FOOD INSECURITY: WITHIN THE PAST 12 MONTHS, THE FOOD YOU BOUGHT JUST DIDN'T LAST AND YOU DIDN'T HAVE MONEY TO GET MORE.: NEVER TRUE

## 2020-09-10 ASSESSMENT — ENCOUNTER SYMPTOMS
ABDOMINAL PAIN: 0
SHORTNESS OF BREATH: 0
TROUBLE SWALLOWING: 0
PHOTOPHOBIA: 0
NAUSEA: 0
BACK PAIN: 1

## 2020-09-10 NOTE — PROGRESS NOTES
Henrry Da Silva 45 y.o. male presents today with   Chief Complaint   Patient presents with    Back Pain     Pulled back while working out last night. Back Pain   This is a new problem. The current episode started in the past 7 days. The problem occurs constantly. The problem is unchanged. The pain is present in the lumbar spine and gluteal. The quality of the pain is described as aching. The pain is at a severity of 7/10. The pain is severe. The symptoms are aggravated by twisting, standing and bending. Pertinent negatives include no abdominal pain, chest pain or fever. Past Medical History:   Diagnosis Date    Depression     Thyroid disease      Patient Active Problem List    Diagnosis Date Noted    Hypothyroidism 08/15/2019    Depression 09/28/2017    Anxiety 09/28/2017    Lumbar radicular pain 11/15/2013     No past surgical history on file. No family history on file.   Social History     Socioeconomic History    Marital status:      Spouse name: None    Number of children: None    Years of education: None    Highest education level: None   Occupational History    None   Social Needs    Financial resource strain: Not hard at all   [place] insecurity     Worry: Never true     Inability: Never true   [employer] needs     Medical: None     Non-medical: None   Tobacco Use    Smoking status: Never Smoker    Smokeless tobacco: Current User     Types: Chew   Substance and Sexual Activity    Alcohol use: Not Currently    Drug use: Never    Sexual activity: None   Lifestyle    Physical activity     Days per week: None     Minutes per session: None    Stress: None   Relationships    Social connections     Talks on phone: None     Gets together: None     Attends [employer] service: None     Active member of club or organization: None     Attends meetings of clubs or organizations: None     Relationship status: None    Intimate partner violence     Fear of current or ex partner: None     Emotionally abused: None     Physically abused: None     Forced sexual activity: None   Other Topics Concern    None   Social History Narrative    None     No Known Allergies    Review of Systems   Constitutional: Negative for fatigue and fever. HENT: Negative for trouble swallowing. Eyes: Negative for photophobia and visual disturbance. Respiratory: Negative for shortness of breath. Cardiovascular: Negative for chest pain and palpitations. Gastrointestinal: Negative for abdominal pain and nausea. Genitourinary: Negative for decreased urine volume and urgency. Musculoskeletal: Positive for back pain. Skin: Negative for rash. Neurological: Negative for tremors and syncope. Hematological: Does not bruise/bleed easily. Psychiatric/Behavioral: Negative for suicidal ideas. Vitals:    09/10/20 0926   BP: 134/80   Resp: 14   Weight: 233 lb (105.7 kg)   Height: 5' 9\" (1.753 m)       Physical Exam  Constitutional:       Appearance: He is well-developed. HENT:      Head: Normocephalic and atraumatic. Mouth/Throat:      Mouth: Mucous membranes are moist.   Eyes:      Conjunctiva/sclera: Conjunctivae normal.      Pupils: Pupils are equal, round, and reactive to light. Neck:      Musculoskeletal: Normal range of motion. Cardiovascular:      Rate and Rhythm: Normal rate and regular rhythm. Pulmonary:      Effort: Pulmonary effort is normal. No respiratory distress. Breath sounds: No wheezing. Abdominal:      General: Bowel sounds are normal. There is no distension. Palpations: Abdomen is soft. Tenderness: There is no abdominal tenderness. Musculoskeletal:      Lumbar back: He exhibits decreased range of motion and spasm. Skin:     General: Skin is dry. Coloration: Skin is not jaundiced. Neurological:      Mental Status: He is alert. Cranial Nerves: No cranial nerve deficit.      Assessment/Plan  Carina Cole was seen today for back pain.    Diagnoses and all orders for this visit:    Acute left-sided low back pain without sciatica  -     cyclobenzaprine (FLEXERIL) 10 MG tablet; Take 1 tablet by mouth 3 times daily as needed for Muscle spasms  -     methylPREDNISolone (MEDROL DOSEPACK) 4 MG tablet; Take by mouth.  -     ibuprofen (ADVIL;MOTRIN) 800 MG tablet; Take 1 tablet by mouth 2 times daily as needed for Pain        No follow-ups on file.     Gilford Reap, MD

## 2020-09-21 ENCOUNTER — OFFICE VISIT (OUTPATIENT)
Dept: PRIMARY CARE CLINIC | Age: 50
End: 2020-09-21
Payer: COMMERCIAL

## 2020-09-21 VITALS
DIASTOLIC BLOOD PRESSURE: 82 MMHG | HEART RATE: 79 BPM | OXYGEN SATURATION: 96 % | BODY MASS INDEX: 34.66 KG/M2 | TEMPERATURE: 97.8 F | WEIGHT: 234 LBS | HEIGHT: 69 IN | SYSTOLIC BLOOD PRESSURE: 128 MMHG

## 2020-09-21 PROCEDURE — G8427 DOCREV CUR MEDS BY ELIG CLIN: HCPCS | Performed by: INTERNAL MEDICINE

## 2020-09-21 PROCEDURE — 99213 OFFICE O/P EST LOW 20 MIN: CPT | Performed by: INTERNAL MEDICINE

## 2020-09-21 PROCEDURE — 4004F PT TOBACCO SCREEN RCVD TLK: CPT | Performed by: INTERNAL MEDICINE

## 2020-09-21 PROCEDURE — 3017F COLORECTAL CA SCREEN DOC REV: CPT | Performed by: INTERNAL MEDICINE

## 2020-09-21 PROCEDURE — G8417 CALC BMI ABV UP PARAM F/U: HCPCS | Performed by: INTERNAL MEDICINE

## 2020-09-21 RX ORDER — ATORVASTATIN CALCIUM 10 MG/1
10 TABLET, FILM COATED ORAL DAILY
Qty: 90 TABLET | Refills: 3 | Status: SHIPPED | OUTPATIENT
Start: 2020-09-21

## 2020-09-21 RX ORDER — TESTOSTERONE CYPIONATE 200 MG/ML
200 INJECTION INTRAMUSCULAR
Qty: 4 ML | Refills: 5 | Status: SHIPPED | OUTPATIENT
Start: 2020-09-21 | End: 2021-04-28 | Stop reason: SDUPTHER

## 2020-09-21 ASSESSMENT — ENCOUNTER SYMPTOMS
CHOKING: 0
SHORTNESS OF BREATH: 0
NAUSEA: 0
VOICE CHANGE: 0
TROUBLE SWALLOWING: 0
VOMITING: 0
PHOTOPHOBIA: 0

## 2020-09-21 NOTE — PROGRESS NOTES
week: Not on file     Minutes per session: Not on file    Stress: Not on file   Relationships    Social connections     Talks on phone: Not on file     Gets together: Not on file     Attends [employer] service: Not on file     Active member of club or organization: Not on file     Attends meetings of clubs or organizations: Not on file     Relationship status: Not on file    Intimate partner violence     Fear of current or ex partner: Not on file     Emotionally abused: Not on file     Physically abused: Not on file     Forced sexual activity: Not on file   Other Topics Concern    Not on file   Social History Narrative    Not on file     No Known Allergies    Review of Systems   Constitutional: Negative for fatigue and fever. HENT: Negative for trouble swallowing and voice change. Eyes: Negative for photophobia and visual disturbance. Respiratory: Negative for choking and shortness of breath. Cardiovascular: Negative for chest pain and palpitations. Gastrointestinal: Negative for nausea and vomiting. Genitourinary: Negative for decreased urine volume and urgency. Musculoskeletal: Positive for arthralgias. Skin: Negative for rash. Neurological: Negative for tremors and syncope. Hematological: Does not bruise/bleed easily. Psychiatric/Behavioral: Negative for suicidal ideas. Vitals:    09/21/20 1020   BP: 128/82   Site: Left Upper Arm   Position: Sitting   Cuff Size: Large Adult   Pulse: 79   Temp: 97.8 °F (36.6 °C)   TempSrc: Oral   SpO2: 96%   Weight: 234 lb (106.1 kg)   Height: 5' 8.5\" (1.74 m)       Physical Exam  Constitutional:       Appearance: He is well-developed. HENT:      Head: Normocephalic and atraumatic. Mouth/Throat:      Mouth: Mucous membranes are moist.   Eyes:      Conjunctiva/sclera: Conjunctivae normal.      Pupils: Pupils are equal, round, and reactive to light. Neck:      Musculoskeletal: Normal range of motion. No neck rigidity.    Cardiovascular: Rate and Rhythm: Normal rate and regular rhythm. Pulmonary:      Effort: Pulmonary effort is normal. No respiratory distress. Breath sounds: No wheezing. Abdominal:      General: Bowel sounds are normal. There is no distension. Palpations: Abdomen is soft. Musculoskeletal: Normal range of motion. Skin:     General: Skin is dry. Coloration: Skin is not jaundiced. Neurological:      Mental Status: He is alert. Cranial Nerves: No cranial nerve deficit. Psychiatric:         Mood and Affect: Mood normal.       Assessment/Plan  Severa Lambert was seen today for establish care. Diagnoses and all orders for this visit:    Fatigue, unspecified type  -     testosterone cypionate (DEPOTESTOTERONE CYPIONATE) 200 MG/ML injection; Inject 1 mL into the muscle every 7 days for 180 days. At risk for heart disease  -     atorvastatin (LIPITOR) 10 MG tablet; Take 1 tablet by mouth daily    Low libido  -     testosterone cypionate (DEPOTESTOTERONE CYPIONATE) 200 MG/ML injection; Inject 1 mL into the muscle every 7 days for 180 days. Hypotestosteronemia in male  -     testosterone cypionate (DEPOTESTOTERONE CYPIONATE) 200 MG/ML injection; Inject 1 mL into the muscle every 7 days for 180 days. No follow-ups on file.     Lorraine Saleh MD

## 2020-12-11 ENCOUNTER — OFFICE VISIT (OUTPATIENT)
Dept: PRIMARY CARE CLINIC | Age: 50
End: 2020-12-11
Payer: COMMERCIAL

## 2020-12-11 VITALS
SYSTOLIC BLOOD PRESSURE: 127 MMHG | RESPIRATION RATE: 16 BRPM | DIASTOLIC BLOOD PRESSURE: 70 MMHG | BODY MASS INDEX: 37.1 KG/M2 | OXYGEN SATURATION: 100 % | HEIGHT: 68 IN | WEIGHT: 244.8 LBS | HEART RATE: 64 BPM

## 2020-12-11 PROCEDURE — G0402 INITIAL PREVENTIVE EXAM: HCPCS | Performed by: INTERNAL MEDICINE

## 2020-12-11 PROCEDURE — 3017F COLORECTAL CA SCREEN DOC REV: CPT | Performed by: INTERNAL MEDICINE

## 2020-12-11 ASSESSMENT — PATIENT HEALTH QUESTIONNAIRE - PHQ9
SUM OF ALL RESPONSES TO PHQ QUESTIONS 1-9: 0
2. FEELING DOWN, DEPRESSED OR HOPELESS: 0
SUM OF ALL RESPONSES TO PHQ9 QUESTIONS 1 & 2: 0
SUM OF ALL RESPONSES TO PHQ QUESTIONS 1-9: 0
SUM OF ALL RESPONSES TO PHQ QUESTIONS 1-9: 0
1. LITTLE INTEREST OR PLEASURE IN DOING THINGS: 0

## 2020-12-11 ASSESSMENT — ENCOUNTER SYMPTOMS
TROUBLE SWALLOWING: 0
VOICE CHANGE: 0
ABDOMINAL DISTENTION: 0
VOMITING: 0
PHOTOPHOBIA: 0
NAUSEA: 0
CHOKING: 0
SHORTNESS OF BREATH: 0

## 2020-12-11 NOTE — PROGRESS NOTES
Patricia Appiah 45 y.o. male presents today with   Chief Complaint   Patient presents with    Annual Exam     2020       HPI annual exam    Past Medical History:   Diagnosis Date    Depression     Thyroid disease      Patient Active Problem List    Diagnosis Date Noted    Hypothyroidism 08/15/2019    Depression 09/28/2017    Anxiety 09/28/2017    Lumbar radicular pain 11/15/2013     History reviewed. No pertinent surgical history.   Family History   Problem Relation Age of Onset    Diabetes Mother     Heart Disease Mother     Diabetes Father     Heart Disease Father     Diabetes Brother     Heart Disease Maternal Grandmother     Heart Disease Paternal Grandfather     Diabetes Paternal Grandfather      Social History     Socioeconomic History    Marital status:      Spouse name: None    Number of children: None    Years of education: None    Highest education level: None   Occupational History    None   Social Needs    Financial resource strain: Not hard at all   [place] insecurity     Worry: Never true     Inability: Never true   [place] needs     Medical: None     Non-medical: None   Tobacco Use    Smoking status: Never Smoker    Smokeless tobacco: Current User     Types: Chew   Substance and Sexual Activity    Alcohol use: Not Currently    Drug use: Never    Sexual activity: None   Lifestyle    Physical activity     Days per week: None     Minutes per session: None    Stress: None   Relationships    Social connections     Talks on phone: None     Gets together: None     Attends [employer] service: None     Active member of club or organization: None     Attends meetings of clubs or organizations: None     Relationship status: None    Intimate partner violence     Fear of current or ex partner: None     Emotionally abused: None     Physically abused: None     Forced sexual activity: None   Other Topics Concern    None   Social History Narrative    None     No Known Allergies    Review of Systems   Constitutional: Negative for fatigue and fever. HENT: Negative for congestion, trouble swallowing and voice change. Eyes: Negative for photophobia and visual disturbance. Respiratory: Negative for choking and shortness of breath. Cardiovascular: Negative for chest pain and palpitations. Gastrointestinal: Negative for abdominal distention, nausea and vomiting. Genitourinary: Negative for decreased urine volume, testicular pain and urgency. Musculoskeletal: Negative for arthralgias. Skin: Negative for rash. Neurological: Negative for tremors and syncope. Hematological: Does not bruise/bleed easily. Psychiatric/Behavioral: Negative for agitation and suicidal ideas. Vitals:    12/11/20 1344   BP: 127/70   Site: Left Upper Arm   Position: Sitting   Cuff Size: Large Adult   Pulse: 64   Resp: 16   SpO2: 100%   Weight: 244 lb 12.8 oz (111 kg)   Height: 5' 8\" (1.727 m)       Physical Exam  Constitutional:       Appearance: He is well-developed. HENT:      Head: Normocephalic and atraumatic. Mouth/Throat:      Mouth: Mucous membranes are moist.   Eyes:      Conjunctiva/sclera: Conjunctivae normal.      Pupils: Pupils are equal, round, and reactive to light. Neck:      Musculoskeletal: Normal range of motion. Cardiovascular:      Rate and Rhythm: Normal rate and regular rhythm. Pulmonary:      Effort: Pulmonary effort is normal. No respiratory distress. Breath sounds: No wheezing. Abdominal:      General: There is no distension. Musculoskeletal: Normal range of motion. Skin:     General: Skin is dry. Coloration: Skin is not jaundiced. Neurological:      Mental Status: He is alert. Cranial Nerves: No cranial nerve deficit. Assessment/Plan  Tessa Shay was seen today for annual exam.    Diagnoses and all orders for this visit:    Preventative health care  -     Hemoglobin A1C; Future  -     Lipid Panel;  Future        Return in about 1 year (around 12/11/2021), or if symptoms worsen or fail to improve.     Serge Montoya MD

## 2021-07-28 ENCOUNTER — HOSPITAL ENCOUNTER (EMERGENCY)
Age: 51
Discharge: HOME | End: 2021-07-28
Attending: EMERGENCY MEDICINE
Payer: COMMERCIAL

## 2021-07-28 VITALS
WEIGHT: 230 LBS | OXYGEN SATURATION: 98 % | HEART RATE: 86 BPM | SYSTOLIC BLOOD PRESSURE: 136 MMHG | TEMPERATURE: 100 F | BODY MASS INDEX: 34.07 KG/M2 | RESPIRATION RATE: 16 BRPM | HEIGHT: 69 IN | DIASTOLIC BLOOD PRESSURE: 84 MMHG

## 2021-07-28 DIAGNOSIS — U07.1 COVID-19: Primary | ICD-10-CM

## 2021-07-28 LAB — SARS-COV-2, NAAT: DETECTED

## 2021-07-28 PROCEDURE — 6370000000 HC RX 637 (ALT 250 FOR IP): Performed by: EMERGENCY MEDICINE

## 2021-07-28 PROCEDURE — 87635 SARS-COV-2 COVID-19 AMP PRB: CPT

## 2021-07-28 PROCEDURE — 99283 EMERGENCY DEPT VISIT LOW MDM: CPT

## 2021-07-28 RX ORDER — ACETAMINOPHEN 500 MG
1000 TABLET ORAL ONCE
Status: COMPLETED | OUTPATIENT
Start: 2021-07-28 | End: 2021-07-28

## 2021-07-28 RX ADMIN — ACETAMINOPHEN 1000 MG: 500 TABLET ORAL at 21:50

## 2021-07-28 ASSESSMENT — PAIN DESCRIPTION - DESCRIPTORS: DESCRIPTORS: ACHING

## 2021-07-28 ASSESSMENT — PAIN DESCRIPTION - ORIENTATION: ORIENTATION: ANTERIOR

## 2021-07-28 ASSESSMENT — ENCOUNTER SYMPTOMS
EYE DISCHARGE: 0
BACK PAIN: 0
ABDOMINAL PAIN: 0
EYE REDNESS: 0
NAUSEA: 0
SHORTNESS OF BREATH: 0
SORE THROAT: 0
COUGH: 1
VOMITING: 0

## 2021-07-28 ASSESSMENT — PAIN SCALES - GENERAL
PAINLEVEL_OUTOF10: 8
PAINLEVEL_OUTOF10: 4

## 2021-07-28 ASSESSMENT — PAIN DESCRIPTION - FREQUENCY: FREQUENCY: CONTINUOUS

## 2021-07-28 ASSESSMENT — PAIN DESCRIPTION - PROGRESSION: CLINICAL_PROGRESSION: NOT CHANGED

## 2021-07-28 ASSESSMENT — PAIN DESCRIPTION - ONSET: ONSET: GRADUAL

## 2021-07-28 ASSESSMENT — PAIN DESCRIPTION - LOCATION: LOCATION: HEAD

## 2021-07-28 ASSESSMENT — PAIN DESCRIPTION - PAIN TYPE: TYPE: ACUTE PAIN

## 2021-07-29 ENCOUNTER — CARE COORDINATION (OUTPATIENT)
Dept: CARE COORDINATION | Age: 51
End: 2021-07-29

## 2021-07-29 NOTE — CARE COORDINATION
Patient contacted regarding COVID-19 risk, exposure, diagnosis, pulse oximeter ordered at discharge and monoclonal antibody infusion follow up. Discussed COVID-19 related testing which was available at this time. Test results were positive. Patient informed of results, if available? Yes. Ambulatory Care Manager contacted the patient by telephone to perform post discharge assessment. Call within 2 business days of discharge: Yes. Verified name and  with patient as identifiers. Provided introduction to self, and explanation of the CTN/ACM role, and reason for call due to risk factors for infection and/or exposure to COVID-19. Symptoms reviewed with patient who verbalized the following symptoms: fever, fatigue, cough, shortness of breath, diarrhea, no new symptoms and no worsening symptoms. Due to no new or worsening symptoms encounter was not routed to provider for escalation. Discussed follow-up appointments. If no appointment was previously scheduled, appointment scheduling offered: Yes. [facility] follow up appointment(s): No future appointments. Non-[facility] follow up appointment(s): PER PATIENT CURRENTLY USING  PROVIDER DR En Cazares. HE IS CONSIDERING RETURNING TO DR. Nury Harry   PATIENT ADVISED TO FU WITH A PCP  Non-face-to-face services provided:  Obtained and reviewed discharge summary and/or continuity of care documents  Education of patient/family/caregiver/guardian to support self-management-COVID EDU     Advance Care Planning:   Does patient have an Advance Directive:  reviewed and current. Educated patient about risk for severe COVID-19 due to risk factors according to CDC guidelines. ACM reviewed discharge instructions, medical action plan and red flag symptoms with the patient who verbalized understanding. Discussed COVID vaccination status: Yes. Education provided on COVID-19 vaccination as appropriate. Discussed exposure protocols and quarantine with CDC Guidelines.  Patient was given an opportunity to verbalize any questions and concerns and agrees to contact ACM or health care provider for questions related to their healthcare. Reviewed and educated patient on any new and changed medications related to discharge diagnosis     Was patient discharged with a pulse oximeter? No Discussed and confirmed pulse oximeter discharge instructions and when to notify provider or seek emergency care. ACM provided contact information. Plan for follow-up call in 5-7 days based on severity of symptoms and risk factors. ACM SPOKE TO PATIENT. 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 ACM  REVIEWED ED VISIT AND AVS.  SEE S/S LISTED ABOVE  PER PATIENT HE HAS BEEN IN [place] AND [place]. PER PATIENT HE IS FULLY VACCINATED. PER PATIENT RECEIVED MODERNA AT [facility] IN [place]. 1ST IN April/2ND MAY   ACM REVIEWED COVID EDU/S/S AND RISKS  ACM REVIEWED ISOLATION GUIDELINES   Steps to help prevent the spread of COVID-19 if you are sick  SOURCE - https://shantanuAlumnizetom.info/. html     Stay home except to get medical care   ; Stay home: People who are mildly ill with COVID-19 are able to isolate at home during their illness. You should restrict activities outside your home, except for getting medical care.   ; Avoid public areas: Do not go to work, school, or public areas.   ; Avoid public transportation: Avoid using public transportation, ride-sharing, or taxis.  ; Separate yourself from other people and animals in your home   ; Stay away from others: As much as possible, you should stay in a specific room and away from other people in your home. Also, you should use a separate bathroom, if available.   ; Limit contact with pets & animals: You should restrict contact with pets and other animals while you are sick with COVID-19, just like you would around other people.  Although there have not been reports of pets or other animals becoming sick with COVID-19, it is still recommended that people sick with COVID-19 limit contact with animals until more information is known about the virus. ; When possible, have another member of your household care for your animals while you are sick. If you are sick with COVID-19, avoid contact with your pet, including petting, snuggling, being kissed or licked, and sharing food. If you must care for your pet or be around animals while you are sick, wash your hands before and after you interact with pets and wear a facemask. See COVID-19 and Animals for more information. Other considerations   The ill person should eat/be fed in their room if possible. Non-disposable  items used should be handled with gloves and washed with hot water or in a . Clean hands after handling used  items.  If possible, dedicate a lined trash can for the ill person. Use gloves when removing garbage bags, handling, and disposing of trash. Wash hands after handling or disposing of trash.  Consider consulting with your local health department about trash disposal guidance if available. Information for Household Members and Caregivers of Someone who is Sick   Call ahead before visiting your doctor   Call ahead: If you have a medical appointment, call the healthcare provider and tell them that you have or may have COVID-19. This will help the healthcare provider's office take steps to keep other people from getting infected or exposed. Wear a facemask if you are sick   ; If you are sick: You should wear a facemask when you are around other people (e.g., sharing a room or vehicle) or pets and before you enter a healthcare provider's office. ; If you are caring for others: If the person who is sick is not able to wear a facemask (for example, because it causes trouble breathing), then people who live with the person who is sick should not stay in the same room with them, or they should wear a facemask if they enter a room with the person who is sick.   Cover your coughs and sneezes   ; Cover: Cover your mouth and nose with a tissue when you cough or sneeze.   ; Dispose: Throw used tissues in a lined trash can.   ; Wash hands: Immediately wash your hands with soap and water for at least 20 seconds or, if soap and water are not available, clean your hands with an alcohol-based hand  that contains at least 60% alcohol. Clean your hands often   ; Wash hands: Wash your hands often with soap and water for at least 20 seconds, especially after blowing your nose, coughing, or sneezing; going to the bathroom; and before eating or preparing food.   ; Hand : If soap and water are not readily available, use an alcohol-based hand  with at least 60% alcohol, covering all surfaces of your hands and rubbing them together until they feel dry.   ; Soap and water: Soap and water are the best option if hands are visibly dirty.   ; Avoid touching: Avoid touching your eyes, nose, and mouth with unwashed hands. Handwashing Tips   ; Wet your hands with clean, running water (warm or cold), turn off the tap, and apply soap.  ; Lather your hands by rubbing them together with the soap. Lather the backs of your hands, between your fingers, and under your nails. ; Scrub your hands for at least 20 seconds. Need a timer? Hum the [place] from beginning to end twice.  ; Rinse your hands well under clean, running water.  ; Dry your hands using a clean towel or air dry them. Avoid sharing personal household items   ; Do not share: You should not share dishes, drinking glasses, cups, eating utensils, towels, or bedding with other people or pets in your home.   ; Wash thoroughly after use: After using these items, they should be washed thoroughly with soap and water.   Clean all high-touch surfaces everyday   ; Clean and disinfect: Practice routine cleaning of high touch surfaces.  ; High touch surfaces include counters, tabletops, doorknobs, bathroom fixtures, toilets, phones, keyboards, tablets, and bedside tables.  ; Disinfect areas with bodily fluids: Also, clean any surfaces that may have blood, stool, or body fluids on them.   ; Household : Use a household cleaning spray or wipe, according to the label instructions. Labels contain instructions for safe and effective use of the cleaning product including precautions you should take when applying the product, such as wearing gloves and making sure you have good ventilation during use of the product. Monitor your symptoms   Seek medical attention: Seek prompt medical attention if your illness is worsening     (e.g., difficulty breathing).   ; Call your doctor: Before seeking care, call your healthcare provider and tell them that you have, or are being evaluated for, COVID-19.   ; Wear a facemask when sick: Put on a facemask before you enter the facility. These steps will help the healthcare provider's office to keep other people in the office or waiting room from getting infected or exposed. ; Alert health department: Ask your healthcare provider to call the local or state health department. Persons who are placed under active monitoring or facilitated self-monitoring should follow instructions provided by their local health department or occupational health professionals, as appropriate.  ; Call 911 if you have a medical emergency: If you have a medical emergency and need to call 911, notify the dispatch personnel that you have, or are being evaluated for COVID-19. If possible, put on a facemask before emergency medical services arrive.

## 2021-07-29 NOTE — ED PROVIDER NOTES
[facility] ED  EMERGENCY DEPARTMENT ENCOUNTER      Pt Name: Alberto Pereira  MRN: [id number]  [place] 1970  Date of evaluation: 7/28/2021  Provider: Torie Murray DO        HISTORY OF PRESENT ILLNESS    Alberto Pereira is a 45 y.o. male per chart review has ah/o depression, thyroid disease. He has been traveling and did a home COVID-19 test.  He was in [facility] and [place]. He did get the Covid-19 vaccine. He started developing a fever and body aches yesterday when he returned. The history is provided by the patient. URI  Presenting symptoms: congestion, cough and fever    Presenting symptoms: no ear pain and no sore throat    Severity:  Moderate  Onset quality:  Sudden  Timing:  Constant  Progression:  Worsening  Chronicity:  New  Relieved by:  Nothing  Worsened by:  Nothing  Associated symptoms: myalgias    Associated symptoms: no neck pain    Risk factors: recent illness and recent travel             REVIEW OF SYSTEMS       Review of Systems   Constitutional: Positive for fever. Negative for chills. HENT: Positive for congestion. Negative for ear pain and sore throat. Eyes: Negative for discharge and redness. Respiratory: Positive for cough. Negative for shortness of breath. Cardiovascular: Negative for chest pain and palpitations. Gastrointestinal: Negative for abdominal pain, nausea and vomiting. Genitourinary: Negative for difficulty urinating and dysuria. Musculoskeletal: Positive for myalgias. Negative for back pain and neck pain. Skin: Negative for rash and wound. Neurological: Negative for dizziness and syncope. Psychiatric/Behavioral: Negative for confusion. The patient is not nervous/anxious. All other systems reviewed and are negative. Except as noted above the remainder of the review of systems was reviewed and negative.        PAST MEDICAL HISTORY     Past Medical History:   Diagnosis Date    Depression     Thyroid disease          SURGICAL HISTORY History reviewed. No pertinent surgical history. CURRENT MEDICATIONS       Discharge Medication List as of 7/28/2021 10:20 PM      CONTINUE these medications which have NOT CHANGED    Details   testosterone cypionate (DEPOTESTOTERONE CYPIONATE) 200 MG/ML injection Inject 1 mL into the muscle every 7 days for 90 days. , Disp-4 mL, R-2Normal      SYNTHROID 125 MCG tablet TAKE ONE TABLET BY MOUTH EVERY DAY, Disp-30 tablet, R-5, DAWNormal      atorvastatin (LIPITOR) 10 MG tablet Take 1 tablet by mouth daily, Disp-90 tablet,R-3Normal      ibuprofen (ADVIL;MOTRIN) 800 MG tablet Take 1 tablet by mouth 2 times daily as needed for Pain, Disp-60 tablet,R-5Normal      omeprazole (PRILOSEC) 40 MG delayed release capsule Take 1 capsule by mouth daily, Disp-30 capsule, R-3Normal             ALLERGIES     Patient has no known allergies.     FAMILY HISTORY       Family History   Problem Relation Age of Onset    Diabetes Mother     Heart Disease Mother     Diabetes Father     Heart Disease Father     Diabetes Brother     Heart Disease Maternal Grandmother     Heart Disease Paternal Grandfather     Diabetes Paternal Grandfather           SOCIAL HISTORY       Social History     Socioeconomic History    Marital status:      Spouse name: None    Number of children: None    Years of education: None    Highest education level: None   Occupational History    None   Tobacco Use    Smoking status: Never Smoker    Smokeless tobacco: Current User     Types: Chew   Substance and Sexual Activity    Alcohol use: Not Currently    Drug use: Never    Sexual activity: None   Other Topics Concern    None   Social History Narrative    None     Social Determinants of Health     Financial Resource Strain: Low Risk     Difficulty of Paying Living Expenses: Not hard at all   Food Insecurity: No Food Insecurity    Worried About Running Out of Food in the Last Year: Never true    Rene of Food in the Last Year: Never true Transportation Needs:     Lack of Transportation (Medical):  Lack of Transportation (Non-Medical):    Physical Activity:     Days of Exercise per Week:     Minutes of Exercise per Session:    Stress:     Feeling of Stress :    Social Connections:     Frequency of Communication with Friends and Family:     Frequency of Social Gatherings with Friends and Family:     Attends Restoration Services:     Active Member of Clubs or Organizations:     Attends Club or Organization Meetings:     Marital Status:    Intimate Partner Violence:     Fear of Current or Ex-Partner:     Emotionally Abused:     Physically Abused:     Sexually Abused:          PHYSICAL EXAM       ED Triage Vitals [07/28/21 2114]   BP Temp Temp Source Pulse Resp SpO2 Height Weight   139/84 100 °F (37.8 °C) Temporal 91 14 98 % 5' 9\" (1.753 m) 230 lb (104.3 kg)       Physical Exam  Vitals and nursing note reviewed. Constitutional:       Appearance: Normal appearance. HENT:      Head: Normocephalic and atraumatic. Right Ear: Tympanic membrane normal.      Left Ear: Tympanic membrane normal.      Nose: Nose normal.      Mouth/Throat:      Mouth: Mucous membranes are moist.      Pharynx: Oropharynx is clear. Eyes:      General: Lids are normal.      Extraocular Movements: Extraocular movements intact. Conjunctiva/sclera: Conjunctivae normal.      Pupils: Pupils are equal, round, and reactive to light. Cardiovascular:      Rate and Rhythm: Normal rate and regular rhythm. Pulses: Normal pulses. Heart sounds: Normal heart sounds. Pulmonary:      Effort: Pulmonary effort is normal.      Breath sounds: Normal breath sounds. Abdominal:      General: Abdomen is flat. Bowel sounds are normal.      Palpations: Abdomen is soft. Musculoskeletal:         General: Normal range of motion. Cervical back: Full passive range of motion without pain, normal range of motion and neck supple.    Skin:     General: Skin is warm.      Capillary Refill: Capillary refill takes less than 2 seconds. Neurological:      General: No focal deficit present. Mental Status: He is alert and oriented to person, place, and time. Deep Tendon Reflexes: Reflexes are normal and symmetric. Psychiatric:         Attention and Perception: Attention and perception normal.         Mood and Affect: Mood normal.         Behavior: Behavior normal. Behavior is cooperative. LABS:  Labs Reviewed   COVID-19, RAPID - Abnormal; Notable for the following components:       Result Value    SARS-CoV-2, NAAT DETECTED (*)     All other components within normal limits    Narrative:     Victorina RIOS tel. [phone],  results called to er/ marline stoll, 07/28/2021 22:01, by Shaila Canchola         MDM:   Vitals:    Vitals:    07/28/21 2114 07/28/21 2223   BP: 139/84 136/84   Pulse: 91 86   Resp: 14 16   Temp: 100 °F (37.8 °C)    TempSrc: Temporal    SpO2: 98% 98%   Weight: 230 lb (104.3 kg)    Height: 5' 9\" (1.753 m)        MDM  Number of Diagnoses or Management Options  COVID-19  Diagnosis management comments: Patient presents with COVID-19 symptoms and fever. Tylenol and rapid covid-19 test ordered. Rapid covid positive. The patient will be discharged home. He will follow up in 2 days with his primary care doctors. Huy Davalos DO     The lab results, radiology and test results were reviewed with the patient and family. The patient will follow up in 2 days with their primary care doctor. If their symptoms change or get worse they will return to the ER. CRITICAL CARE TIME   Total CriticalCare time was 0 minutes, excluding separately reportable procedures. There was a high probability of clinically significant/life threatening deterioration in the patient's condition which required my urgent intervention.         PROCEDURES:  Unlessotherwise noted below, none     Procedures      FINAL IMPRESSION      1. COVID-19          DISPOSITION/PLAN DISPOSITION Decision To Discharge 07/28/2021 10:02:02 PM          Jaison Grant DO (electronically signed)  Attending Emergency Physician          Roxy Tavares DO  07/31/21 [id number]

## 2021-08-05 ENCOUNTER — CARE COORDINATION (OUTPATIENT)
Dept: CARE COORDINATION | Age: 51
End: 2021-08-05

## 2021-08-05 NOTE — CARE COORDINATION
You Patient resolved from the Care Transitions episode on 8/5/2021  Discussed COVID-19 related testing which was available at this time. Test results were positive. Patient informed of results, if available? Yes and Completed 7/28/2021. Patient/family has been provided the following resources and education related to COVID-19:                         Signs, symptoms and red flags related to COVID-19            CDC exposure and quarantine guidelines            [facility]it exposure contact - [phone]            Contact for their local Department of Health                 Patient currently reports that the following symptoms have improved:  no new/worsening symptoms     No further outreach scheduled with this CTN/ACM. Episode of Care resolved. Patient has this CTN/ACM contact information if future needs arise. Keisha Villarreal tells me that he is totally asymptomatic and back to normal.    Further follow up calls not required.

## 2021-09-25 DIAGNOSIS — E03.9 HYPOTHYROIDISM, UNSPECIFIED TYPE: ICD-10-CM

## 2021-09-27 RX ORDER — LEVOTHYROXINE SODIUM 125 MCG
TABLET ORAL
Qty: 30 TABLET | Refills: 0 | Status: SHIPPED | OUTPATIENT
Start: 2021-09-27

## 2021-12-02 ENCOUNTER — HOSPITAL ENCOUNTER (OUTPATIENT)
Dept: PHYSICAL THERAPY | Age: 51
Setting detail: THERAPIES SERIES
Discharge: HOME | End: 2021-12-02
Payer: COMMERCIAL

## 2021-12-02 PROCEDURE — 97161 PT EVAL LOW COMPLEX 20 MIN: CPT

## 2021-12-02 PROCEDURE — G0283 ELEC STIM OTHER THAN WOUND: HCPCS

## 2021-12-02 ASSESSMENT — PAIN DESCRIPTION - LOCATION: LOCATION: SHOULDER

## 2021-12-02 ASSESSMENT — PAIN DESCRIPTION - PAIN TYPE: TYPE: ACUTE PAIN

## 2021-12-02 ASSESSMENT — PAIN DESCRIPTION - ORIENTATION: ORIENTATION: RIGHT

## 2021-12-02 ASSESSMENT — PAIN SCALES - GENERAL: PAINLEVEL_OUTOF10: 4

## 2021-12-02 ASSESSMENT — PAIN DESCRIPTION - DESCRIPTORS: DESCRIPTORS: ACHING;CRAMPING

## 2021-12-02 NOTE — PROGRESS NOTES
[address]  PHYSICAL THERAPY EVALUATION    Date: 2021  Patient Name: Pippa Alexander       MRN: [id number]   Account: [de-identified]   : 1970  (45 y.o.)   Gender: male   Referring Practitioner: Miles Arnold MD                 Diagnosis: right shoulder strain  Treatment Diagnosis: R shoulder pain, impaired R shoulder P/AROM, impaired lifting/reaching/carrying with RUE           Past Medical History:  has a past medical history of Depression and Thyroid disease. Past Surgical History:   has no past surgical history on file. Vital Signs  Patient Currently in Pain: Yes   Pain Screening  Patient Currently in Pain: Yes  Pain Assessment  Pain Assessment: 0-10  Pain Level: 4  Pain Type: Acute pain  Pain Location: Shoulder  Pain Orientation: Right  Pain Descriptors: Aching; Cramping        Lives With: Family  Type of Home: House  Home Layout: Two level; Work area in basement  ADL Assistance: Independent  Homemaking Assistance: Independent  Ambulation Assistance: Independent  Transfer Assistance: Independent  Active : Yes  Occupation: Full time employment  Type of occupation:   Leisure & Hobbies: [employer] training        Subjective:  Subjective: Pt reports onset of R shoulder and periscapular pain ~1 week ago when doing barbell rows. Pt did workout 2 consecutive days post initial injury. Upon progression of pain, pt had f/u with MD where he received X-ray, medication, and shoulder injection. Pt notes pain is located in mid to upper trap and now radiates down R posterior arm. Notes mild discomfort with cervical SB L. Intermit RUE paresthesias. Pain has significantly decreased since yesterday per patient, although intermit deep pains noted that impair pt's ability to reach in various directions and performing normal upper body lifting routine.        Objective:   Strength RUE  Comment: shoulder 5/5 throughout; mild pain noted with resisted empty can and ER  Strength LUE  Strength LUE: WNL  Comment: 5/5 throughout shoulder        PROM RUE (degrees)  RUE PROM: WFL  RUE General PROM: pain provocation with shoulder ER and horizontal ABD, end range flexion, and superior and inferior glides     AROM RUE (degrees)  RUE AROM : WFL  RUE General AROM: intermit cramping pains noted \"deep\" within shoulder  AROM LUE (degrees)  LUE AROM : WNL    Spine  Cervical: WFL all planes; no pain provocation with Spurlings or cervical compression        Exercises:   Exercises  Exercise 1: *isometrics  Exercise 2: *closed chain stability  Exercise 3: *progressive loading  Modalities:  Modalities  Cryotherapy (Minutes\Location): CP to R shoulder x 10 min  E-stim (parameters): premod: lead 1 medial trap, lead 2 anter/posterior shoulder; 10 min  Manual:  Manual therapy  PROM: *R shoulder  Other: *IDN  *Indicates exercise,modality, or manual techniques to be initiated when appropriate    Assessment: Body structures, Functions, Activity limitations: Increased pain, Decreased ROM, Decreased high-level IADLs  Assessment: Pt is a 46 yo male presenting with ongoing R shoulder and periscapular pain arising about 1 week ago during upper body weight training (barbell rows). Full strength throughout shoulder still displayed although pain provocated by arthrokinematic glides and mildly with rotational movement patterns. Negative for spurlings testing/cervical compression and no pain provocation with muscular palpation throughout R shoulder girdle. Pt recommended to continue with medication as prescribed and continue icing shoulder for ongoing pain/inflammation mgmt. Plan to f/u 1-2x/wk with focus on improving pain free R shoulder P/AROM, shoulder joint stability/functional strength, and HEP for ongoing pain and recovery mgmt. Pt trialed e-stim today noting good pain relief post application.   Prognosis: Good  Discharge Recommendations: Continue to assess pending progress        Decision Making: Low Complexity  History: acute onset, no significant shoulder injury hx  Exam: good strength, pain with passive joint glides  Clinical Presentation: ongoing        Plan  Frequency/Duration:  Plan  Times per week: 1-2  Plan weeks: 4-5  Current Treatment Recommendations: Manual Therapy - Soft Tissue Mobilization, Manual Therapy - Joint Manipulation, ROM, Strengthening, Modalities, Integrated Dry Needling, Home Exercise Program         Patient Education  New Education Provided: PT Education: PT Role; Plan of Care; Goals    POST-PAIN     Pain Rating (0-10 pain scale):  0 /10  Location and pain description same as pre-treatment unless indicated. Action: [] NA  [] Call Physician  [] Perform HEP  [x] Meds as prescribed    Evaluation and patient rights have been reviewed and patient agrees with plan of care. Yes  [x]  No  []   Explain:       Jj Fall Risk Assessment  Risk Factor Scale  Score   History of Falls [] Yes  [x] No 25  0    Secondary Diagnosis [] Yes  [x] No 15  0    Ambulatory Aid [] Furniture  [] Crutches/cane/walker  [x] None/bedrest/wheelchair/nurse 30  15  0    IV/Heparin Lock [] Yes  [x] No 20  0    Gait/Transferring [] Impaired  [] Weak  [x] Normal/bedrest/immobile 20  10  0    Mental Status [] Forgets limitations  [x] Oriented to own ability 15  0       Total: 0     Based on the Assessment score: check the appropriate box.   [x]  No intervention needed   Low =   Score of 0-24  []  Use standard prevention interventions Moderate =  Score of 24-44   [] Discuss fall prevention strategies   [] Indicate moderate falls risk on eval  []  Use high risk prevention interventions High = Score of 45 and higher   [] Discuss fall prevention strategies   [] Provide supervision during treatment time    Goals  Long term goals  Time Frame for Long term goals : 4-5 weeks  Long term goal 1: R shoulder P/AROM WNL w/o pain to improve reaching in all directions  Long term goal 2: R shoulder strength 5/5 w/o pain to resistance to allow full return recreational exercise w/o discomfort  Long term goal 3:  [place] with HEP to self manage symptoms and exercises upon discharge         PT Individual Minutes  Time In: 1100  Time Out: 1150  Minutes: 50     Procedure Minutes: 10 min e-stim/CP       Electronically signed by Fortino Kelly PT on 12/2/21 at 4:42 PM EST

## 2021-12-02 NOTE — PROGRESS NOTES
[address] [address]     Ph: [phone]  Fax: [phone]    [] Certification  [] Recertification [x]  Plan of Care  [] Progress Note [] Discharge      To:  Aminah Phillips MD      From:  Ruy Pelletier, PT, DPT  Patient: González Pimentel     : 1970  Diagnosis: right shoulder strain     Date: 2021  Treatment Diagnosis: R shoulder pain, impaired R shoulder P/AROM, impaired lifting/reaching/carrying with RUE       Progress Report Period from:  2021  to 2021    Total # of Visits to Date: 1   No Show: 0    Canceled Appointment: 0     OBJECTIVE:   Long Term Goals - Time Frame for Long term goals : 4-5 weeks  Goals Current/ Discharge status Met   Long term goal 1: R shoulder P/AROM WNL w/o pain to improve reaching in all directions PROM RUE (degrees)  RUE PROM: WFL  RUE General PROM: pain provocation with shoulder ER and horizontal ABD, end range flexion, and superior and inferior glides     AROM LUE (degrees)  LUE AROM : WNL  Spine  Cervical: WFL all planes; no pain provocation with Spurlings or cervical compression  AROM RUE (degrees)  RUE AROM : WFL  RUE General AROM: intermit cramping pains noted \"deep\" within shoulder     [] yes  [x] no   Long term goal 2: R shoulder strength 5/5 w/o pain to resistance to allow full return recreational exercise w/o discomfort Strength RUE  Comment: shoulder 5/5 throughout; mild pain noted with resisted empty can and ERFunctionally limited by pain     Strength LUE  Strength LUE: WNL  Comment: 5/5 throughout shoulder      [] yes  [x] no   Long term goal 3:  [place] with HEP to self manage symptoms and exercises upon discharge HEP to be implemented [] yes  [] no       Body structures, Functions, Activity limitations: Increased pain, Decreased ROM, Decreased high-level IADLs  Assessment: Pt is a 44 yo male presenting with ongoing R shoulder and periscapular pain arising about 1 week ago during upper body weight training (barbell rows). Full strength throughout shoulder still displayed although pain provocated by arthrokinematic glides and mildly with rotational movement patterns. Negative for spurlings testing/cervical compression and no pain provocation with muscular palpation throughout R shoulder girdle. Pt recommended to continue with medication as prescribed and continue icing shoulder for ongoing pain/inflammation mgmt. Plan to f/u 1-2x/wk with focus on improving pain free R shoulder P/AROM, shoulder joint stability/functional strength, and HEP for ongoing pain and recovery mgmt. Pt trialed e-stim today noting good pain relief post application. Prognosis: Good  Discharge Recommendations: Continue to assess pending progress      PT Education: PT Role; Plan of Care; Goals    PLAN: [x] Evaluate and Treat  Frequency/Duration:  Plan  Times per week: 1-2  Plan weeks: 4-5  Current Treatment Recommendations: Manual Therapy - Soft Tissue Mobilization, Manual Therapy - Joint Manipulation, ROM, Strengthening, Modalities, Integrated Dry Needling, Home Exercise Program     Precautions:   N/a                          Patient Status:[x] Continue/ Initiate plan of Care    [] Discharge PT. Recommend pt continue with HEP. [] Additional visits requested, Please re-certify for additional visits:          Signature: Electronically signed by Claudean Reagin, PT on 12/2/21 at 4:45 PM EST      If you have any questions or concerns, please don't hesitate to call. Thank you for your referral.    I have reviewed this plan of care and certify a need for medically necessary rehabilitation services.     Physician Signature:__________________________________________________________  Date:  Please sign and return

## 2021-12-09 ENCOUNTER — HOSPITAL ENCOUNTER (OUTPATIENT)
Dept: PHYSICAL THERAPY | Age: 51
Setting detail: THERAPIES SERIES
Discharge: HOME | End: 2021-12-09
Payer: COMMERCIAL

## 2021-12-09 PROCEDURE — 97110 THERAPEUTIC EXERCISES: CPT

## 2021-12-09 PROCEDURE — G0283 ELEC STIM OTHER THAN WOUND: HCPCS

## 2021-12-09 ASSESSMENT — PAIN DESCRIPTION - PAIN TYPE: TYPE: ACUTE PAIN

## 2021-12-09 ASSESSMENT — PAIN SCALES - GENERAL: PAINLEVEL_OUTOF10: 1

## 2021-12-09 ASSESSMENT — PAIN DESCRIPTION - LOCATION: LOCATION: SHOULDER

## 2021-12-09 ASSESSMENT — PAIN DESCRIPTION - ORIENTATION: ORIENTATION: RIGHT

## 2021-12-09 ASSESSMENT — PAIN DESCRIPTION - DESCRIPTORS: DESCRIPTORS: DULL;ACHING

## 2021-12-09 NOTE — PROGRESS NOTES
[address] [address]  Outpatient Physical Therapy    Treatment Note        Date: 2021  Patient: Luke Muse  : 1970  ACCT #: [de-identified]  Referring Practitioner: Marylee Foster, MD  Diagnosis: right shoulder strain  Treatment Diagnosis: R shoulder pain, impaired R shoulder P/AROM, impaired lifting/reaching/carrying with RUE    Visit Information:  PT Visit Information  PT Insurance Information: [employer]  Total # of Visits Approved:  (BMN)  Total # of Visits to Date: 2  No Show: 0  Canceled Appointment: 0  Progress Note Counter: 2/10    Subjective: R shoulder pain continues to gradually improve. Pt notes paresthesia has started in R hand in ulnar distribution (4th and 5th digits). Pt did lift upper body on  and boxed in the basement yesterday. HEP Compliance:  [x] Good [] Fair [] Poor [] Reports not doing due to:    Vital Signs  Patient Currently in Pain: Yes   Pain Screening  Patient Currently in Pain: Yes  Pain Assessment  Pain Assessment: 0-10  Pain Level: 1  Pain Type: Acute pain  Pain Location: Shoulder  Pain Orientation: Right  Pain Descriptors: Dull; Aching    OBJECTIVE:   Exercises  Exercise 1: pulleys: x3 min  Exercise 2: 6-way shoulder isometrics: 5-10\" x 10 ea  Exercise 3: countertop plank, alternating shoulder taps to fatigue  Exercise 4: counterop plank, rotational reach away (single arm stability)  Exercise 5: ER wall climbs to fatigue    Strength: [x] NT  [] MMT completed:    ROM: [x] NT  [] ROM measurements:     Manual: none this session       Modalities:  Modalities  Cryotherapy (Minutes\Location): CP to R shoulder x 10 min  E-stim (parameters): IFC, R shoulder x 10 min     *Indicates exercise, modality, or manual techniques to be initiated when appropriate    Assessment: Body structures, Functions, Activity limitations: Increased pain, Decreased ROM, Decreased high-level IADLs  Assessment: New onset hand paresthesia reported in 4th and 5th digits. Paresthsias increase with isometric shoulder adduction but gradually decreased as functional exercises progress. Focused session on isometric and closed chain stability. Good tolerance to all exercises with no significant pain increase. E-stim to R shoulder continued for ongoing soft tissue healing/pain relief. Treatment Diagnosis: R shoulder pain, impaired R shoulder P/AROM, impaired lifting/reaching/carrying with RUE  Prognosis: Good       Goals:       Long term goals  Time Frame for Long term goals : 4-5 weeks  Long term goal 1: R shoulder P/AROM WNL w/o pain to improve reaching in all directions  Long term goal 2: R shoulder strength 5/5 w/o pain to resistance to allow full return recreational exercise w/o discomfort  Long term goal 3: [place] with HEP to self manage symptoms and exercises upon discharge  Progress toward goals: progressing toward all     POST-PAIN       Pain Rating (0-10 pain scale):  0 /10   Location and pain description same as pre-treatment unless indicated. Action: [] NA   [x] Perform HEP  [] Meds as prescribed  [x] Modalities as prescribed   [] Call Physician     Frequency/Duration:  Plan  Times per week: 1-2  Plan weeks: 4-5  Current Treatment Recommendations: Manual Therapy - Soft Tissue Mobilization, Manual Therapy - Joint Manipulation, ROM, Strengthening, Modalities, Integrated Dry Needling, Home Exercise Program     Pt to continue current HEP. See objective section for any therapeutic exercise changes, additions or modifications this date.          PT Individual Minutes  Time In: 0915  Time Out: 1879  Minutes: 41  Timed Code Treatment Minutes: 30 Minutes  Procedure Minutes:10 min e-stim/ice     Timed Activity Minutes Units   Ther Ex 30 2       Signature:  Electronically signed by Jorie Boast, PT on 12/9/21 at 9:56 AM EST

## 2021-12-14 ENCOUNTER — HOSPITAL ENCOUNTER (OUTPATIENT)
Dept: PHYSICAL THERAPY | Age: 51
Setting detail: THERAPIES SERIES
Discharge: HOME | End: 2021-12-14
Payer: COMMERCIAL

## 2021-12-14 PROCEDURE — 97110 THERAPEUTIC EXERCISES: CPT

## 2021-12-14 NOTE — PROGRESS NOTES
[id number] [address]  Outpatient Physical Therapy    Treatment Note        Date: 2021  Patient: Kesha Green  : 1970  ACCT #: [de-identified]  Referring Practitioner: Jonny Cain MD  Diagnosis: right shoulder strain  Treatment Diagnosis: R shoulder pain, impaired R shoulder P/AROM, impaired lifting/reaching/carrying with RUE    Visit Information:  PT Visit Information  PT Insurance Information: [employer]  Total # of Visits Approved:  (BMN)  Total # of Visits to Date: 3  No Show: 0  Canceled Appointment: 0  Progress Note Counter: 3/10    Subjective: R shoulder pain continues to improve. No pain at arrival. Still having intermit paresthesia in ramírez aspect of R hand. Pt went to the gym this morning and was able to do BW dips w/o pain. Pt believes he feels 100% recovered. HEP Compliance:  [x] Good [] Fair [] Poor [] Reports not doing due to:    Vital Signs  Patient Currently in Pain: Denies   Pain Screening  Patient Currently in Pain: Denies    OBJECTIVE:   Exercises  Exercise 1: UBE: L4 x 5 min alternating  Exercise 3: wall plank, alternating shoulder taps x 15  Exercise 4: wall plank, rotational reach away (single arm stability) x 15  Exercise 5: ER wall climbs x 10  Exercise 6: BOSU plank, lateral rocking: 3' timer, 20\" on/20\" off  Exercise 7: CC lat pull downs: 5 plates x 15  Exercise 8: shoulder flex and ABD to 90: 8# x 10 ea jey  Exercise 9: ROM/strength assessment    Strength: [] NT  [x] MMT completed:  Strength RUE  Comment: shoulder 5/5 throughout; no pain upon resistance, full AROM with good tolerance to open and closed chain loading of shoulder  Strength LUE  Comment: 5/5 throughout shoulder       ROM: [] NT  [x] ROM measurements:  AROM RUE (degrees)  RUE AROM : WFL  RUE General AROM: pain free AROM       Assessment:         Body structures, Functions, Activity limitations: Increased pain, Decreased ROM, Decreased high-level IADLs  Assessment: Pt declines ongoing shoulder pain at this time. Has been progressing back to recreational exercise without limitations in mobility or pain. Improved R shoulder strength noted via AROM, open chain loading, and closed chain loading. R shoulder AROM equal to contralateral side. Pt to be placed on therapy hold at this time while continuing with HEP and self progressing workout routine. Will D/C PT POC upon pt request or 30 day period w/o further intervention to address pain/deficits. Treatment Diagnosis: R shoulder pain, impaired R shoulder P/AROM, impaired lifting/reaching/carrying with RUE  Prognosis: Good       Goals:       Long term goals  Time Frame for Long term goals : 4-5 weeks  Long term goal 1: R shoulder P/AROM WNL w/o pain to improve reaching in all directions  Long term goal 2: R shoulder strength 5/5 w/o pain to resistance to allow full return recreational exercise w/o discomfort  Long term goal 3: [place] with HEP to self manage symptoms and exercises upon discharge  Progress toward goals: progressing toward all     POST-PAIN       Pain Rating (0-10 pain scale):   0/10   Location and pain description same as pre-treatment unless indicated. Action: [x] NA   [] Perform HEP  [] Meds as prescribed  [] Modalities as prescribed   [] Call Physician     Frequency/Duration:  Plan  Times per week: 1-2  Plan weeks: 4-5  Current Treatment Recommendations: Manual Therapy - Soft Tissue Mobilization, Manual Therapy - Joint Manipulation, ROM, Strengthening, Modalities, Integrated Dry Needling, Home Exercise Program  Plan Comment: PT HOLD while pt continues with HEP     Pt to continue current HEP. See objective section for any therapeutic exercise changes, additions or modifications this date.          PT Individual Minutes  Time In: 0602  Time Out: 1110  Minutes: 30  Timed Code Treatment Minutes: 30 Minutes  Procedure Minutes: 0     Timed Activity Minutes Units   Ther Ex 30 2       Signature:  Electronically signed by Tena Kolb on 12/14/21 at 11:18 AM EST

## 2021-12-17 ENCOUNTER — APPOINTMENT (OUTPATIENT)
Dept: PHYSICAL THERAPY | Age: 51
End: 2021-12-17
Payer: COMMERCIAL

## 2022-01-17 NOTE — PROGRESS NOTES
[address] [address]                                  Ph: [phone]  Fax: [phone]     []? Certification  []? Recertification      []? Plan of Care  []? Progress Note [x]? Discharge                            To:  Miles Arnold MD      From:  Tavia Valladares, PT, DPT  Patient: Pippa Alexander     : 1970  Diagnosis: right shoulder strain     Date: 2022  Treatment Diagnosis: R shoulder pain, impaired R shoulder P/AROM, impaired lifting/reaching/carrying with RUE     Progress Report Period from:  2021  to 2021     Total # of Visits to Date: 3   No Show: 0    Canceled Appointment: 0      OBJECTIVE:   Long Term Goals - Time Frame for Long term goals : 4-5 weeks  Goals Current/ Discharge status Met   Long term goal 1: R shoulder P/AROM WNL w/o pain to improve reaching in all directions Full AROM displayed at last tx session, 21       [x]? yes  []? no   Long term goal 2: R shoulder strength 5/5 w/o pain to resistance to allow full return recreational exercise w/o discomfort 5/5 throughout jey shoulder  Able to resume weight training program    [x]? yes  []? no   Long term goal 3: [place] with HEP to self manage symptoms and exercises upon discharge HEP provided for shoulder stability exercises [x]? yes  []? no         Body structures, Functions, Activity limitations: Increased pain, Decreased ROM, Decreased high-level IADLs  Assessment: Pt completed 3 PT visits to address R shoulder deficits. Pt progressed back to full ROM and strength, noting ability to resume recreational weight training workouts at [facility]. Pt contacted today, 22 to discuss ongoing POC. Pt requests therapy discharge noting no need for services at this time.      PLAN:   Frequency/Duration:  D/C PT POC                                                Patient Status:[]?  Continue/ Initiate plan of Care [x]? Discharge PT. Recommend pt continue with HEP. []? Additional visits requested, Please re-certify for additional visits:                                                       Signature: Electronically signed by Comfort Hodges PT on 1/17/2022 at 2:12 PM          If you have any questions or concerns, please don't hesitate to call.   Thank you for your referral.

## 2022-11-27 ENCOUNTER — HOSPITAL ENCOUNTER (EMERGENCY)
Age: 52
Discharge: HOME | End: 2022-11-27
Payer: COMMERCIAL

## 2022-11-27 VITALS
RESPIRATION RATE: 17 BRPM | HEIGHT: 69 IN | OXYGEN SATURATION: 96 % | DIASTOLIC BLOOD PRESSURE: 85 MMHG | HEART RATE: 70 BPM | TEMPERATURE: 98 F | BODY MASS INDEX: 34.07 KG/M2 | SYSTOLIC BLOOD PRESSURE: 133 MMHG | WEIGHT: 230 LBS

## 2022-11-27 DIAGNOSIS — S46.911A STRAIN OF RIGHT SHOULDER, INITIAL ENCOUNTER: Primary | ICD-10-CM

## 2022-11-27 PROCEDURE — 6360000002 HC RX W HCPCS

## 2022-11-27 PROCEDURE — 99284 EMERGENCY DEPT VISIT MOD MDM: CPT

## 2022-11-27 PROCEDURE — 96372 THER/PROPH/DIAG INJ SC/IM: CPT

## 2022-11-27 RX ORDER — MELOXICAM 7.5 MG/1
7.5 TABLET ORAL DAILY
Qty: 30 TABLET | Refills: 3 | Status: SHIPPED | OUTPATIENT
Start: 2022-11-27

## 2022-11-27 RX ORDER — PREDNISONE 20 MG/1
40 TABLET ORAL DAILY
Qty: 20 TABLET | Refills: 0 | Status: SHIPPED | OUTPATIENT
Start: 2022-11-27 | End: 2022-12-07

## 2022-11-27 RX ORDER — KETOROLAC TROMETHAMINE 30 MG/ML
30 INJECTION, SOLUTION INTRAMUSCULAR; INTRAVENOUS ONCE
Status: COMPLETED | OUTPATIENT
Start: 2022-11-27 | End: 2022-11-27

## 2022-11-27 RX ORDER — ORPHENADRINE CITRATE 30 MG/ML
60 INJECTION INTRAMUSCULAR; INTRAVENOUS ONCE
Status: COMPLETED | OUTPATIENT
Start: 2022-11-27 | End: 2022-11-27

## 2022-11-27 RX ORDER — CYCLOBENZAPRINE HCL 10 MG
10 TABLET ORAL 3 TIMES DAILY PRN
Qty: 21 TABLET | Refills: 0 | Status: SHIPPED | OUTPATIENT
Start: 2022-11-27 | End: 2022-12-07

## 2022-11-27 RX ADMIN — ORPHENADRINE CITRATE 60 MG: 30 INJECTION INTRAMUSCULAR; INTRAVENOUS at 14:35

## 2022-11-27 RX ADMIN — KETOROLAC TROMETHAMINE 30 MG: 30 INJECTION, SOLUTION INTRAMUSCULAR; INTRAVENOUS at 14:35

## 2022-11-27 ASSESSMENT — PAIN DESCRIPTION - LOCATION: LOCATION: SHOULDER

## 2022-11-27 ASSESSMENT — PAIN DESCRIPTION - PAIN TYPE: TYPE: ACUTE PAIN

## 2022-11-27 ASSESSMENT — ENCOUNTER SYMPTOMS
RHINORRHEA: 0
CHOKING: 0
SHORTNESS OF BREATH: 0
ABDOMINAL PAIN: 0
ABDOMINAL DISTENTION: 0
DIARRHEA: 0
NAUSEA: 0
COUGH: 0
BACK PAIN: 0
VOMITING: 0
EYES NEGATIVE: 1
SORE THROAT: 0

## 2022-11-27 ASSESSMENT — PAIN DESCRIPTION - ORIENTATION: ORIENTATION: RIGHT

## 2022-11-27 ASSESSMENT — PAIN - FUNCTIONAL ASSESSMENT
PAIN_FUNCTIONAL_ASSESSMENT: 0-10
PAIN_FUNCTIONAL_ASSESSMENT: PREVENTS OR INTERFERES SOME ACTIVE ACTIVITIES AND ADLS

## 2022-11-27 ASSESSMENT — PAIN SCALES - GENERAL: PAINLEVEL_OUTOF10: 5

## 2022-11-27 ASSESSMENT — PAIN DESCRIPTION - FREQUENCY: FREQUENCY: CONTINUOUS

## 2022-11-27 ASSESSMENT — PAIN DESCRIPTION - DESCRIPTORS: DESCRIPTORS: ACHING

## 2022-11-27 NOTE — ED PROVIDER NOTES
[facility] ED  eMERGENCYdEPARTMENT eNCOUnter      Pt Name: Radha Hannah  MRN: [id number]  [place] 1970  Date of evaluation: 11/27/2022  Minesh Shepherd PA-C    CHIEF COMPLAINT           HPI  Radha Hannah is a 47 y.o. male per chart review has depression and anxiety presents to the ED with abrupt onset of constant, moderate, aching right shoulder pain for 1 week. Patient has known injury after he was lifting weights at the gym and states he tore his trapezius muscle. Has had chronic shoulder issues in the past from weightlifting. Says he took ibuprofen and Tylenol however that has not helped with the pain, he has gotten his range of motion back although he is in pain. Denies any tingling, says there is mild numbness in his fourth and fifth finger. ROS  Review of Systems   Constitutional:  Negative for chills, fatigue and fever. HENT:  Negative for congestion, rhinorrhea, sneezing and sore throat. Eyes: Negative. Respiratory:  Negative for cough, choking and shortness of breath. Cardiovascular:  Negative for chest pain. Gastrointestinal:  Negative for abdominal distention, abdominal pain, diarrhea, nausea and vomiting. Genitourinary:  Negative for dysuria, flank pain, hematuria and urgency. Musculoskeletal:  Positive for myalgias. Negative for arthralgias, back pain, gait problem and joint swelling. Neurological:  Negative for dizziness, seizures, syncope, facial asymmetry, speech difficulty, weakness, light-headedness, numbness and headaches. Except as noted above the remainder of the review of systems was reviewed and negative. PAST MEDICAL HISTORY     Past Medical History:   Diagnosis Date    Depression     Thyroid disease          SURGICAL HISTORY     No past surgical history on file.       CURRENTMEDICATIONS       Previous Medications    ATORVASTATIN (LIPITOR) 10 MG TABLET    Take 1 tablet by mouth daily    IBUPROFEN (ADVIL;MOTRIN) 800 MG TABLET Take 1 tablet by mouth 2 times daily as needed for Pain    OMEPRAZOLE (PRILOSEC) 40 MG DELAYED RELEASE CAPSULE    Take 1 capsule by mouth daily    SYNTHROID 125 MCG TABLET    TAKE ONE TABLET BY MOUTH EVERY DAY    TESTOSTERONE CYPIONATE (DEPOTESTOTERONE CYPIONATE) 200 MG/ML INJECTION    Inject 1 mL into the muscle every 7 days for 90 days. ALLERGIES     Patient has no known allergies. FAMILY HISTORY       Family History   Problem Relation Age of Onset    Diabetes Mother     Heart Disease Mother     Diabetes Father     Heart Disease Father     Diabetes Brother     Heart Disease Maternal Grandmother     Heart Disease Paternal Grandfather     Diabetes Paternal Grandfather           SOCIAL HISTORY       Social History     Socioeconomic History    Marital status:    Tobacco Use    Smoking status: Never    Smokeless tobacco: Current     Types: Chew   Substance and Sexual Activity    Alcohol use: Not Currently    Drug use: Never         PHYSICAL EXAM       ED Triage Vitals [11/27/22 1413]   BP Temp Temp Source Heart Rate Resp SpO2 Height Weight   133/85 98 °F (36.7 °C) Temporal 70 17 96 % 5' 9\" (1.753 m) 230 lb (104.3 kg)       Physical Exam  Constitutional:       General: He is not in acute distress. Appearance: He is not ill-appearing. HENT:      Head: Normocephalic. Right Ear: Ear canal and external ear normal.      Left Ear: Ear canal and external ear normal.      Nose: Nose normal.      Mouth/Throat:      Mouth: Mucous membranes are moist.      Pharynx: Oropharynx is clear. Eyes:      Pupils: Pupils are equal, round, and reactive to light. Cardiovascular:      Rate and Rhythm: Normal rate and regular rhythm. Pulmonary:      Effort: Pulmonary effort is normal. No respiratory distress. Breath sounds: Normal breath sounds. Abdominal:      General: There is no distension. Tenderness: There is no abdominal tenderness.  There is no right CVA tenderness, left CVA tenderness, guarding or rebound. Musculoskeletal:         General: Tenderness present. Normal range of motion. Right shoulder: Tenderness present. No swelling, deformity, effusion, laceration, bony tenderness or crepitus. Normal range of motion. Left shoulder: Normal.      Right upper arm: Normal.      Left upper arm: Normal.      Right elbow: Normal.      Left elbow: Normal.      Comments: Pulses 2+, neurovascularly intact, sensation is intact. Patient has full active range of motion although he is in pain. Tenderness of the right deltoid muscle and trapezius muscle. Skin:     General: Skin is warm and dry. Capillary Refill: Capillary refill takes 2 to 3 seconds. Neurological:      General: No focal deficit present. Mental Status: He is alert and oriented to person, place, and time. Mental status is at baseline. Cranial Nerves: No cranial nerve deficit. Sensory: No sensory deficit. Motor: No weakness. Gait: Gait normal.   Psychiatric:         Mood and Affect: Mood normal.         Behavior: Behavior normal.         MDM    Patient is a 58-year-old male who presents to the ED with abrupt onset of right shoulder pain for 1 week after injuring it lifting weights. Has had chronic injuries before due to weightlifting. Has mild numbness in his fourth and fifth right fingers. Has full active range of motion although he is in pain. He is currently afebrile with a temp of 98 degrees, hemodynamically stable. Resting comfortably, calm and cooperative. Given Norflex and tramadol IM in the ED. Offered x-ray, patient declined. Prescribed prednisone, Flexeril and Mobic. Discussed warning signs and when to return to the ED. He will follow-up with his primary care provider. He is agreeable to this plan and understands, he is without further questions. FINAL IMPRESSION      1.  Strain of right shoulder, initial encounter          DISPOSITION/PLAN   DISPOSITION Decision To Discharge 11/27/2022 02:48:33 PM        DISCHARGE MEDICATIONS:  [unfilled]         Polina Ulrich PA-C(electronically signed)  Attending Emergency Physician           Polina Ulrich PA-C  11/27/22 1348

## 2022-11-27 NOTE — ED TRIAGE NOTES
Pt arrives to ED, from home, via personal vehicle. Pt presents with right shoulder/trapezius pain after injury, last week while lifting weights.

## 2022-11-27 NOTE — ED NOTES
Explained discharge instructions and prescriptions to patient. Went over discharge diagnosis and pertinent educational material with patient. Patient stated understanding of discharge diagnosis, instructions, and prescriptions. Patient denies any questions at this time, all concerns addressed. No signs or symptoms of pain noted at this time. A/0 x3, ambulatory, resps even and unlabored on room air. Follow up instructions and reasons to return to ER reviewed. Patient denies needs at time of discharge.         Pablo Galo RN  11/27/22 7244

## 2023-01-03 ENCOUNTER — HOSPITAL ENCOUNTER (EMERGENCY)
Age: 53
Discharge: HOME | End: 2023-01-03
Attending: EMERGENCY MEDICINE
Payer: COMMERCIAL

## 2023-01-03 VITALS
TEMPERATURE: 97.7 F | SYSTOLIC BLOOD PRESSURE: 133 MMHG | RESPIRATION RATE: 20 BRPM | OXYGEN SATURATION: 97 % | BODY MASS INDEX: 34.07 KG/M2 | DIASTOLIC BLOOD PRESSURE: 89 MMHG | HEIGHT: 69 IN | WEIGHT: 230 LBS | HEART RATE: 68 BPM

## 2023-01-03 DIAGNOSIS — M25.511 TRIGGER POINT OF RIGHT SHOULDER REGION: Primary | ICD-10-CM

## 2023-01-03 PROCEDURE — 6360000002 HC RX W HCPCS: Performed by: EMERGENCY MEDICINE

## 2023-01-03 PROCEDURE — 20552 NJX 1/MLT TRIGGER POINT 1/2: CPT

## 2023-01-03 PROCEDURE — 2500000003 HC RX 250 WO HCPCS: Performed by: EMERGENCY MEDICINE

## 2023-01-03 PROCEDURE — 99284 EMERGENCY DEPT VISIT MOD MDM: CPT

## 2023-01-03 PROCEDURE — 96372 THER/PROPH/DIAG INJ SC/IM: CPT

## 2023-01-03 RX ORDER — KETOROLAC TROMETHAMINE 30 MG/ML
60 INJECTION, SOLUTION INTRAMUSCULAR; INTRAVENOUS ONCE
Status: COMPLETED | OUTPATIENT
Start: 2023-01-03 | End: 2023-01-03

## 2023-01-03 RX ORDER — LIDOCAINE HYDROCHLORIDE 20 MG/ML
5 INJECTION, SOLUTION INFILTRATION; PERINEURAL ONCE
Status: COMPLETED | OUTPATIENT
Start: 2023-01-03 | End: 2023-01-03

## 2023-01-03 RX ORDER — ORPHENADRINE CITRATE 30 MG/ML
60 INJECTION INTRAMUSCULAR; INTRAVENOUS ONCE
Status: COMPLETED | OUTPATIENT
Start: 2023-01-03 | End: 2023-01-03

## 2023-01-03 RX ORDER — METHYLPREDNISOLONE ACETATE 80 MG/ML
60 INJECTION, SUSPENSION INTRA-ARTICULAR; INTRALESIONAL; INTRAMUSCULAR; SOFT TISSUE ONCE
Status: COMPLETED | OUTPATIENT
Start: 2023-01-03 | End: 2023-01-03

## 2023-01-03 RX ORDER — NAPROXEN 500 MG/1
500 TABLET ORAL 2 TIMES DAILY PRN
Qty: 20 TABLET | Refills: 0 | Status: SHIPPED | OUTPATIENT
Start: 2023-01-03 | End: 2023-01-13

## 2023-01-03 RX ORDER — LIDOCAINE 50 MG/G
1 PATCH TOPICAL EVERY 24 HOURS
Qty: 30 PATCH | Refills: 0 | Status: SHIPPED | OUTPATIENT
Start: 2023-01-03 | End: 2023-02-02

## 2023-01-03 RX ORDER — METHOCARBAMOL 500 MG/1
500 TABLET, FILM COATED ORAL 4 TIMES DAILY PRN
Qty: 20 TABLET | Refills: 0 | Status: SHIPPED | OUTPATIENT
Start: 2023-01-03 | End: 2023-01-08

## 2023-01-03 RX ADMIN — KETOROLAC TROMETHAMINE 60 MG: 30 INJECTION, SOLUTION INTRAMUSCULAR at 05:27

## 2023-01-03 RX ADMIN — LIDOCAINE HYDROCHLORIDE 5 ML: 20 INJECTION, SOLUTION INFILTRATION; PERINEURAL at 05:33

## 2023-01-03 RX ADMIN — ORPHENADRINE CITRATE 60 MG: 30 INJECTION INTRAMUSCULAR; INTRAVENOUS at 05:30

## 2023-01-03 RX ADMIN — METHYLPREDNISOLONE ACETATE 60 MG: 80 INJECTION, SUSPENSION INTRA-ARTICULAR; INTRALESIONAL; INTRAMUSCULAR; SOFT TISSUE at 05:28

## 2023-01-03 ASSESSMENT — PAIN - FUNCTIONAL ASSESSMENT: PAIN_FUNCTIONAL_ASSESSMENT: 0-10

## 2023-01-03 ASSESSMENT — PAIN SCALES - GENERAL: PAINLEVEL_OUTOF10: 9

## 2023-01-03 NOTE — ED PROVIDER NOTES
eMERGENCY dEPARTMENT eNCOUnter      [facility]    Chief Complaint   Patient presents with    Shoulder Pain     Right trapezius pain since Sunday after lifting       HPI    Pippa Alexander is a 47 y.o. male with hx of Depression , thyroid disease who presentsto ED from home   By private car   With complaint of R trapezius pain   Onset few days   Intensity of symptoms moderate   Patient was working out his shoulder and later started having spasm and pain . Pain is sharp worse with movement of the shoulder. He denies numbness and tingling , chest pain or SOB       PAST MEDICAL HISTORY    Past Medical History:   Diagnosis Date    Depression     Thyroid disease        SURGICAL HISTORY    No past surgical history on file. CURRENT MEDICATIONS    Current Outpatient Rx   Medication Sig Dispense Refill    lidocaine (LIDODERM) 5 % Place 1 patch onto the skin every 24 hours Place 1 patch onto the skin daily 12 hours on, 12 hours off. 30 patch 0    methocarbamol (ROBAXIN) 500 MG tablet Take 1 tablet by mouth 4 times daily as needed (Muscle spasms) 20 tablet 0    naproxen (NAPROSYN) 500 MG tablet Take 1 tablet by mouth 2 times daily as needed for Pain (with meals) 20 tablet 0    SYNTHROID 125 MCG tablet TAKE ONE TABLET BY MOUTH EVERY DAY 30 tablet 0    testosterone cypionate (DEPOTESTOTERONE CYPIONATE) 200 MG/ML injection Inject 1 mL into the muscle every 7 days for 90 days.  4 mL 2    atorvastatin (LIPITOR) 10 MG tablet Take 1 tablet by mouth daily 90 tablet 3    omeprazole (PRILOSEC) 40 MG delayed release capsule Take 1 capsule by mouth daily 30 capsule 3       ALLERGIES    No Known Allergies    FAMILY HISTORY    Family History   Problem Relation Age of Onset    Diabetes Mother     Heart Disease Mother     Diabetes Father     Heart Disease Father     Diabetes Brother     Heart Disease Maternal Grandmother     Heart Disease Paternal Grandfather     Diabetes Paternal Grandfather        SOCIAL HISTORY    Social History Socioeconomic History    Marital status:    Tobacco Use    Smoking status: Never    Smokeless tobacco: Current     Types: Chew   Substance and Sexual Activity    Alcohol use: Not Currently    Drug use: Never       REVIEW OF SYSTEMS    Constitutional:  Denies fever, chills, weight loss or weakness   Eyes:  Denies photophobia or discharge   HENT:  Denies sore throat or ear pain   Respiratory:  Denies cough or shortness of breath   Cardiovascular:  Denies chest pain, palpitations or swelling   GI:  Denies abdominal pain, nausea, vomiting, or diarrhea   Musculoskeletal:  c/o R shoulder pain , Denies back pain   Skin:  Denies rash   Neurologic:  Denies headache, focal weakness or sensory changes   Endocrine:  Denies polyuria or polydypsia   Lymphatic:  Denies swollen glands   Psychiatric:  Denies depression, suicidal ideation or homicidal ideation   All systems negative except as marked. PHYSICAL EXAM    VITAL SIGNS: /89   Pulse 68   Temp 97.7 °F (36.5 °C)   Resp 20   Ht 5' 9\" (1.753 m)   Wt 230 lb (104.3 kg)   SpO2 97%   BMI 33.97 kg/m²    Constitutional:  Well developed, Well nourished, moderate  acute distress, Non-toxic appearance. HENT:  Normocephalic, Atraumatic, Bilateral external ears normal, Oropharynx moist, No oral exudates, Nose normal. Neck- Normal range of motion, No tenderness, Supple, No stridor. Eyes:  PERRL, EOMI, Conjunctiva normal, No discharge. Respiratory:  Normal breath sounds, No respiratory distress, No wheezing, No chest tenderness. Cardiovascular:  Normal heart rate, Normal rhythm, No murmurs, No rubs, No gallops. GI:  Bowel sounds normal, Soft, No tenderness, No masses, No pulsatile masses. :  No CVA tenderness. Musculoskeletal:  R shoulder- Tenderness present on the medial aspect of the scapula , Spasm present, ROM normal, Distal NV intact    Back- No tenderness. Integument:  Warm, Dry, No erythema, No rash. Lymphatic:  No lymphadenopathy noted. Neurologic:  Alert & oriented x 3, Normal motor function, Normal sensory function, No focal deficits noted. Psychiatric:  Affect normal, Judgment normal, Mood normal.         REEVALUATION   Pain control adeqaute. Feels better    PROCEDURES  Trigger Point injection Note: R shoulder   Trigger Point Injection was discussed with the patient as part of the pain management strategy today. Risks and benefits were discussed. The patient elected to proceed. Tender/Trigger point injections were identified, area was prepped with alcohol prep pad, and each trigger point was injected with 60 mg of DepoMedrol and  4 ml of 2% Lidocaine without epinephrine in the usual fanning fashion, being cautious to stay away from the mid line and to avoid deep structures. The patient tolerated the injections well. No complications were encountered. A total of 1 trigger points were injected. Summation      Patient Course:     ED Medications administered this visit:    Medications   ketorolac (TORADOL) injection 60 mg (has no administration in time range)   orphenadrine (NORFLEX) injection 60 mg (has no administration in time range)   methylPREDNISolone acetate (DEPO-MEDROL) injection 60 mg (has no administration in time range)   lidocaine 2 % injection 5 mL (has no administration in time range)       New Prescriptions from this visit:    New Prescriptions    LIDOCAINE (LIDODERM) 5 %    Place 1 patch onto the skin every 24 hours Place 1 patch onto the skin daily 12 hours on, 12 hours off. METHOCARBAMOL (ROBAXIN) 500 MG TABLET    Take 1 tablet by mouth 4 times daily as needed (Muscle spasms)    NAPROXEN (NAPROSYN) 500 MG TABLET    Take 1 tablet by mouth 2 times daily as needed for Pain (with meals)       Follow-up:  Julian Cornell MD  [facility] [address] [id number] [phone]    Call in 1 day        Final Impression:   1.  Trigger point of right shoulder region               (Please note that portions of this note were completed with a voice recognition program.  Efforts were made to edit the dictations but occasionally words are mis-transcribed.)           Kaylyn Valenzuela MD  01/03/23 7860

## 2023-01-06 ENCOUNTER — HOSPITAL ENCOUNTER (OUTPATIENT)
Dept: PHYSICAL THERAPY | Age: 53
Setting detail: THERAPIES SERIES
Discharge: HOME | End: 2023-01-06
Payer: COMMERCIAL

## 2023-01-06 PROCEDURE — 97140 MANUAL THERAPY 1/> REGIONS: CPT

## 2023-01-06 PROCEDURE — 97162 PT EVAL MOD COMPLEX 30 MIN: CPT

## 2023-01-06 ASSESSMENT — PAIN DESCRIPTION - ORIENTATION: ORIENTATION: RIGHT

## 2023-01-06 ASSESSMENT — PAIN DESCRIPTION - DESCRIPTORS: DESCRIPTORS: SHARP;SHOOTING

## 2023-01-06 ASSESSMENT — PAIN SCALES - GENERAL: PAINLEVEL_OUTOF10: 6

## 2023-01-06 ASSESSMENT — PAIN DESCRIPTION - LOCATION: LOCATION: SHOULDER;NECK

## 2023-01-06 NOTE — PROGRESS NOTES
[address] [place] [address]   EVALUATION            Physical Therapy: Initial Evaluation    Patient: Luke Muse (41 y.o.     male)   Examination Date: 2023   :  1970 ;    Confirmed: Yes MRN: [id number]  CSN: [id number]   Insurance: Payor: [employer] / Plan: [address] [place] [address] / Product Type: *No Product type* /   Insurance ID: [id number] - (Commercial) Secondary Insurance (if applicable):    Referring Physician: Lizett Luong MD      PCP: Marylee Foster, MD Visits to Date/Visits Approved:  /  (BMN)    No Show/Cancelled Appts: 0 / 0     Medical Diagnosis: Strain of unspecified muscle, fascia and tendon at shoulder and upper arm level, right arm, initial encounter [O68.618V]    Treatment Diagnosis: R Cervical/scapular complex pain with radicular symptoms along the ulnar pathway. PERTINENT MEDICAL HISTORY   Patient Assessed for Rehabilitation Services: Yes       Medical History: Chart Reviewed: Yes   Past Medical History:   Diagnosis Date    Depression     Thyroid disease      Surgical History: No past surgical history on file. Medications:   Current Outpatient Medications:     lidocaine (LIDODERM) 5 %, Place 1 patch onto the skin every 24 hours Place 1 patch onto the skin daily 12 hours on, 12 hours off., Disp: 30 patch, Rfl: 0    methocarbamol (ROBAXIN) 500 MG tablet, Take 1 tablet by mouth 4 times daily as needed (Muscle spasms), Disp: 20 tablet, Rfl: 0    naproxen (NAPROSYN) 500 MG tablet, Take 1 tablet by mouth 2 times daily as needed for Pain (with meals), Disp: 20 tablet, Rfl: 0    SYNTHROID 125 MCG tablet, TAKE ONE TABLET BY MOUTH EVERY DAY, Disp: 30 tablet, Rfl: 0    testosterone cypionate (DEPOTESTOTERONE CYPIONATE) 200 MG/ML injection, Inject 1 mL into the muscle every 7 days for 90 days. , Disp: 4 mL, Rfl: 2    atorvastatin (LIPITOR) 10 MG tablet, Take 1 tablet by mouth daily, Disp: 90 tablet, Rfl: 3    omeprazole (PRILOSEC) 40 MG delayed release capsule, Take 1 capsule by mouth daily, Disp: 30 capsule, Rfl: 3  Allergies: Patient has no known allergies. SUBJECTIVE EXAMINATION     History obtained from[de-identified] Patient, Chart Review,           Subjective History: Onset Date: 01/01/23  Subjective: Pt presents with complaints of R UT pain s/p working out, lifting. Complains of Ulnar nerve distribution numbness. Went to ER and was given pain injection with no relief of pain . Pt was given NSAID which helped temporary. Feels better in the am.    Pt present in fwd/flexed cervical posture. 7-8 weeks ago had similar episode which resolved with prednisone. Pt also had episode 1 year ago relieved after PT. Difficulty writing and driving. Pt gets relief with arm in abducted position. Additional Pertinent Hx (if applicable):     Imaging: No results found. Previous treatments prior to current episode?: Outpatient PT, Medications        Pain Screening    Pain Screening  Patient Currently in Pain: Yes  Pain Assessment: 0-10  Pain Level: 6  Best Pain Level: 6  Worst Pain Level: 10  Pain Location: Shoulder, Neck  Pain Orientation: Right  Pain Radiating Towards: Down to 4th and 5th digit  Pain Descriptors: Sharp, Shooting  Aggravating factors: Walking, Standing, Sitting, Sleeping, Supine lying  Pain Management/Relieving Factors: Sidelying position, Ice    Functional Status    Social History:    Social History  Lives With: Family    Occupation/Interests:   Occupation: Full time employment  Type of Occupation: Running business-self employeed  Leisure & Hobbies: Works out.     Prior Level of Function:   100%          Current Level of Function:   75%      ADL Assistance: Independent  Homemaking Assistance: Independent  Homemaking Responsibilities: Yes  Ambulation Assistance: Independent  Transfer Assistance: Independent  Active : Yes  Mode of Transportation: Car    OBJECTIVE EXAMINATION       Review of Systems:  Vision: Within Functional Limits  Hearing: Within functional limits    VBI Screening / Lumbar Screening:   Oral/facial numbness: No  Dizziness: No  Double Vision: No  Blacking Out: No  Difficulty talking/word choice: No  Difficulty swallowing: No  Nausea/vomiting: No  Tinnitus: No  Generalized Weakness: Yes     Regional Screen:   Shoulder Screen: WNL     Observations:   General Observations  Description: Fwd head, flexed head posture, rounded shoulders, protracted scap    Palpation:   Cervical Spine Palpation: R UT tenderness, thoracic T2-T3 pain/tenderness  Cervical/Occiput Palpation: lower cervical tightness tenderness.       Balance Screen:   Balance  Posture: Good  Sitting - Static: Good  Sitting - Dynamic: Good  Standing - Static: Good  Standing - Dynamic: Good  Comments: No falls    Neuro Screen: Sensation  Overall Sensation Status: WNL  Upper Quarter Deep Tendon Reflex (DTR)  Right Biceps (C5-6):  Average/Normal  Left  Biceps (C5-6):  Average/Normal    Left AROM  Right AROM      General AROM UE: Right WNL, Left WNL    General AROM UE: Right WNL, Left WNL      General AROM UE: Right WNL, Left WNL    Left Strength  Right Strength         L UE Strength Comment: 4/5 throughout  L UE Strength Comment: 4/5 throughout    R UE Strength Comment: 4/5 throughout  R UE Strength Comment: 4/5 throughout     Cervical Assessment     AROM Cervical Spine   Cervical spine general AROM: Cervical Flex 20, Ext 50, SB B 38 deg, Rotation 50 B           Special Tests:   Special Tests for Cervical Spine  Special Tests:  (R Abd test (+) relief of symptoms)  Spurlings Test : R (-), L (-)  Vertebral Artery Test : R (-), L (-)  Compression Test : (+)  Sharp-Lincoln Test : (-)  Cervical Distraction Test: (-)  Impingement Tests  Neer Test: L (-), R (-)  Hawkins Gigi Test: R (-), L (-)  Drop Arm: R (-), L (-)    Outcomes Score:  Exam: UEF 47/80=59% functional      Treatment:    Exercises:   Exercises  Exercise 1: scap retraction 5s x 1, chin tuck 5s x 1. levator stretch 10s x 1,  3 min  Exercise 2: ulnar nerve glide*  Exercise 3: pec stretch with foam wedge*  Exercise 4: supine chest pull* diag*  Exercise 8: posture ed: alignment while sitting. Treatment Reasoning  Limitations addressed: Strength, Mobility, Posture, Pain modulation  Functional ability(s) targeted: Reaching overhead, Tolerance to age appropriate activities, Return to work tasks  Modalities:   Cryotherapy (CPT 24015)  Patient Position: Seated  Number Minutes Cryotherapy: 10 min  Cryotherapy location: Cervical, Shoulder  Post treatment skin assessment: Intact  Limitations addressed: Pain modulation  Functional ability(s) targeted: Tolerance to age appropriate activities, Reaching overhead  Electric stimulation, unattended (CPT 46323) /  (Medicare)  Patient Position: Seated  E-stim location: Cervical, Shoulder  E-stim type: Interferential (IFC)  E-stim via: 4 Electrode Pads  Limitations addressed: Pain modulation     Manual:  Manual Therapy  Other: DN/Education ( 5mins) to normalize inflammation, dec pain and provide improved biomechanics ( see DN diagram for sites needled that will be scanned into EMR upon D/C)  Treatment Reasoning  Limitations addressed: Tissue extensibility, Joint motion, Painful spasm  *Indicates exercise,modality, or manual techniques to be initiated when appropriate       ASSESSMENT     Impression: Assessment: The pt's impairments currently limit functional abilities by 41% including his abilities to reach and lift, write, carry, sleep, perform recreational activities, and perform household/work related duties without pain or limitations. Skilled PT required to address above deficits to improve overall function and return to prior level of function.     Body Structures, Functions, Activity Limitations Requiring Skilled Therapeutic Intervention: Decreased ROM, Decreased strength, Decreased tolerance to work activity, Decreased posture, Increased pain    Statement of Medical Necessity: Physical Therapy is both indicated and medically necessary as outlined in the POC to increase the likelihood of meeting the functionally related goals stated below. Patient's Activity Tolerance: Patient tolerated evaluation without incident      Patient's rehabilitation potential/prognosis is considered to be: Good    Factors which may impact rehabilitation potential include: None     Patient Education: Goals, PT Role, Plan of Care, Evaluative findings, Insurance, Home Exercise Program      GOALS   Patient Goal(s): Patient Goals : Decrease pain    Short Term Goals Completed by 2 weeks Goal Status   The pt will demonstrate improved postural awareness requiring <25% VC's with exercises New   Decrease Cervical/R UE radicular pain 50% to assist with improved functional gains/writing New     Long Term Goals Completed by 4-6 weeks Goal Status   LTG 1 Indep HEP for symptom management New   LTG 2 Pt demo improved overall function by reporting greater than 75% per functional survey score New   LTG 3 Pain-free Cervical/scapular AROM to WNL allowing an increase in ADL tolerance. New   LTG 4 Improve scapular/shoulder strength 4+/5 to allow patient to return to overhead reach and lift.  New        TREATMENT PLAN       Requires PT Follow-Up: Yes    Treatment may include any combination of the following: Strengthening, ROM, Modalities, Home exercise program, Manual, Neuromuscular re-education, Dry needling     Frequency / Duration:  Patient to be seen 2-3 times per week for 4-6 weeks  Plan Comment: HEP: posture ed             Eval Complexity:   Decision Making: Medium Complexity  History: Personal Factors and/or Comorbidities Impacting POC: Medium  History: repeated occurance of pain>1 year  Examination of body system(s) including body structures and functions, activity limitations, and/or participation restrictions: Medium  Exam: UEF 47/80=59% functional  Clinical Presentation: Medium  Clinical Decision Making : Medium Complexity    POST-PAIN     Pain Rating (0-10 pain scale):   3.5/10  Location and pain description same as pre-treatment unless indicated. Action: [x] NA  [] Call Physician  [] Perform HEP  [] Meds as prescribed    Evaluation and patient rights have been reviewed and patient agrees with plan of care. Yes  [x]  No  []   Explain:     Gardner Fall Risk Assessment  Risk Factor Scale  Score   History of Falls [] Yes  [x] No 25  0 0   Secondary Diagnosis [] Yes  [x] No 15  0 0   Ambulatory Aid [] Furniture  [] Crutches/cane/walker  [x] None/bedrest/wheelchair/nurse 30  15  0 0   IV/Heparin Lock [] Yes  [x] No 20  0 0   Gait/Transferring [] Impaired  [] Weak  [x] Normal/bedrest/immobile 20  10  0 0   Mental Status [] Forgets limitations  [x] Oriented to own ability 15  0 0      Total:0     Based on the Assessment score: check the appropriate box.   [x]  No intervention needed   Low =   Score of 0-24  []  Use standard prevention interventions Moderate =  Score of 24-44   [] Discuss fall prevention strategies   [] Indicate moderate falls risk on eval  []  Use high risk prevention interventions High = Score of 45 and higher   [] Discuss fall prevention strategies   [] Provide supervision during treatment time      Minutes:  PT Individual Minutes  Time In: 1420  Time Out: 1510  Minutes: 50  Timed Code Treatment Minutes: 9 Minutes  Procedure Minutes:40 min eval     Timed Activity Minutes Units   Ther Ex 4 0   Manual  5 1       Electronically signed by Mehrdad Ponce PT on 1/6/23 at 4:16 PM EST

## 2023-01-06 NOTE — PROGRESS NOTES
[address]  [address]      HYEII:[phone]    [] Certification  [] Recertification [x]  Plan of Care  [] Progress Note [] Discharge      Referring Provider: Benjamin Aase, MD     From:  Yoly Gannon, PT    Patient: Pippa Alexander (89 y.o. male) : 1970 Date: 2023   Medical Diagnosis: Strain of unspecified muscle, fascia and tendon at shoulder and upper arm level, right arm, initial encounter [G14.182M]      Treatment Diagnosis: R Cervical/scapular complex pain with radicular symptoms along the ulnar pathway. Progress Report Period from:  2023  to 2023    Visits to Date: 1 No Show: 0 Cancelled Appts: 0    OBJECTIVE:   Short Term Goals - Time Frame for Short Term Goals: 2 weeks    Goals Current/Discharge status  Status   Short Term Goal 1: The pt will demonstrate improved postural awareness requiring <25% VC's with exercises  General Observations  Description: Fwd head, flexed head posture, rounded shoulders, protracted scap  STG Goal 1 Status[de-identified] New   Short Term Goal 2: Decrease Cervical/R UE radicular pain 50% to assist with improved functional gains/writing  Pain Screening  Patient Currently in Pain: Yes  Pain Assessment: 0-10  Pain Level: 6  Best Pain Level: 6  Worst Pain Level: 10  Pain Location: Shoulder, Neck  Pain Orientation: Right  Pain Radiating Towards: Down to 4th and 5th digit  Pain Descriptors: Sharp, Shooting  Aggravating factors: Walking, Standing, Sitting, Sleeping, Supine lying  Pain Management/Relieving Factors: Sidelying position, Ice   STG Goal 2 Status[de-identified] New   Long Term Goals - Time Frame for Long Term Goals : 4-6 weeks  Goals Current/ Discharge status Met   Long Term Goal 1: Indep HEP for symptom management Written HEP initiated  for symptom management  Needs progression for comprehensive program development.  LTG Goal 1 Status[de-identified] New   Long Term Goal 2: Pt demo improved overall function by reporting greater than 75% per functional survey score Exam: UEF 47/80=59% functional   LTG Goal 2 Status[de-identified] New   Long Term Goal 3: Pain-free Cervical/scapular AROM to WNL allowing an increase in ADL tolerance. AROM Cervical Spine   Cervical spine general AROM: Cervical Flex 20, Ext 50, SB B 38 deg, Rotation 50 B    LTG Goal 3 Status[de-identified] New   Long Term Goal 4: Improve scapular/shoulder strength 4+/5 to allow patient to return to overhead reach and lift. L UE Strength Comment: 4/5 throughout    R UE Strength Comment: 4/5 throughout   LTG Goal 4 Status[de-identified] New     Body Structures, Functions, Activity Limitations Requiring Skilled Therapeutic Intervention: Decreased ROM, Decreased strength, Decreased tolerance to work activity, Decreased posture, Increased pain  Assessment: The pt's impairments currently limit functional abilities by 41% including his abilities to reach and lift, write, carry, sleep, perform recreational activities, and perform household/work related duties without pain or limitations. Skilled PT required to address above deficits to improve overall function and return to prior level of function. Therapy Prognosis: Good    Special Test:          Special Tests for Cervical Spine  Special Tests:  (R Abd test (+) relief of symptoms)  Spurlings Test : R (-), L (-)  Vertebral Artery Test : R (-), L (-)  Compression Test : (+)  Sharp-Lincoln Test : (-)  Cervical Distraction Test: (-)      Impingement Tests  Neer Test: L (-), R (-)  Hawkins Gigi Test: R (-), L (-)  Drop Arm: R (-), L (-)        PT Education: Goals;PT Role;Plan of Care;Evaluative findings; Insurance;Home Exercise Program  Patient Education: HEP: posture ed    PLAN: [x] Evaluate and Treat  Frequency/Duration:  Plan Frequency: 2-3  Plan weeks: 4-6  Current Treatment Recommendations: Strengthening, ROM, Modalities, Home exercise program, Manual, Neuromuscular re-education, Dry needling  Modalities: Heat/Cold, Mechanical Traction, Ultrasound, E-stim - unattended                     Patient Status:[x] Continue/ Initiate plan of Care     [] Discharge PT. Recommend pt continue with HEP. [] Additional visits requested, Please re-certify for additional visits:        Signature: Electronically signed by Yoly Gannon PT on 1/6/23 at 4:14 PM EST      If you have any questions or concerns, please don't hesitate to call. Thank you for your referral.    I have reviewed this plan of care and certify a need for medically necessary rehabilitation services.     Physician Signature:__________________________________________________________  Date:  Please sign and return

## 2023-01-09 ENCOUNTER — HOSPITAL ENCOUNTER (OUTPATIENT)
Dept: PHYSICAL THERAPY | Age: 53
Setting detail: THERAPIES SERIES
Discharge: HOME | End: 2023-01-09
Payer: COMMERCIAL

## 2023-01-09 PROCEDURE — 97140 MANUAL THERAPY 1/> REGIONS: CPT

## 2023-01-09 PROCEDURE — 97110 THERAPEUTIC EXERCISES: CPT

## 2023-01-09 PROCEDURE — G0283 ELEC STIM OTHER THAN WOUND: HCPCS

## 2023-01-09 ASSESSMENT — PAIN SCALES - GENERAL: PAINLEVEL_OUTOF10: 2

## 2023-01-09 ASSESSMENT — PAIN DESCRIPTION - LOCATION: LOCATION: SHOULDER;NECK

## 2023-01-09 ASSESSMENT — PAIN DESCRIPTION - ORIENTATION: ORIENTATION: RIGHT

## 2023-01-09 ASSESSMENT — PAIN DESCRIPTION - DESCRIPTORS: DESCRIPTORS: SHARP;SHOOTING

## 2023-01-09 NOTE — PROGRESS NOTES
[address]  OBM:[phone]  Treatment Note        Date: 2023  Patient: Jennifer Alarcon  : 1970   Confirmed: Yes  MRN: [id number]  Referring Provider: Noé Piper MD      Medical Diagnosis: Strain of unspecified muscle, fascia and tendon at shoulder and upper arm level, right arm, initial encounter [J31.061R]      Treatment Diagnosis: R Cervical/scapular complex pain with radicular symptoms along the ulnar pathway. Visit Information:  Insurance: Payor: MED[employer] / Plan: MED[employer] [address] / Product Type: *No Product type* /   PT Visit Information  Onset Date: 23  PT Insurance Information: Medical [place]  Total # of Visits Approved:  (BMN)  Total # of Visits to Date: 2  No Show: 0  Canceled Appointment: 0  Progress Note Counter:     Subjective Information:  Subjective: Pt reports relief after last visit. Sleeping better and taking percocet 3 times per day for relief. Pt started steroid pack on saturday and will start Gabapentin today. Pt continues to be challenged with fine motor task of R UE.  HEP Compliance:  [x] Good [] Fair [] Poor [] Reports not doing due to:    Pain Screening  Pain Level: 2  Pain Location: Shoulder, Neck  Pain Orientation: Right  Pain Descriptors: [place] Flurry, Shooting    Treatment:  Exercises:  Exercises  Exercise 1: Scap retraction 5s x 10, chin tuck 5s x 10, levator stretch 10s x 10,  Exercise 2: ulnar nerve glide x 5 with R cervical SB  Exercise 3: pec stretch with foam wedge 5 min  Exercise 4: supine chest pull GTB x 15 diag x 10  Exercise 8: posture ed: alignment while sitting.   Treatment Reasoning  Limitations addressed: Strength, Mobility, Posture, Pain modulation  Functional ability(s) targeted: Reaching overhead, Tolerance to age appropriate activities, Return to work tasks    Manual:   Manual Therapy  Other: DN/Education ( 7 mins) to normalize inflammation, dec pain and provide improved biomechanics ( see DN diagram for sites needled that will be scanned into EMR upon D/C)  Treatment Reasoning  Limitations addressed: Joint motion, Tissue extensibility, Painful spasm  Functional ability(s) targeted: Tolerance to age appropriate activities, Ambulating community distances    Modalities:  Cryotherapy (CPT 99345)  Patient Position: Seated  Number Minutes Cryotherapy: 10 min  Cryotherapy location: Cervical, Shoulder  Post treatment skin assessment: Intact  Limitations addressed: Pain modulation  Functional ability(s) targeted: Tolerance to age appropriate activities, Reaching overhead  Electric stimulation, unattended (CPT 37858) /  (Medicare)  Patient Position: Seated  E-stim location: Cervical, Shoulder  E-stim type: Interferential (IFC)  E-stim via: 4 Electrode Pads  Limitations addressed: Pain modulation       *Indicates exercise, modality, or manual techniques to be initiated when appropriate    Assessment: Body Structures, Functions, Activity Limitations Requiring Skilled Therapeutic Intervention: Decreased ROM, Decreased strength, Decreased tolerance to work activity, Decreased posture, Increased pain  Assessment: Pt tolerated new additions to help with muscle balance stretch and strengthening. Challenged with ulnar nerve glides requiring modification. Conclude with estim/cp for pain control with freqent change in position during treatment. Treatment Diagnosis: R Cervical/scapular complex pain with radicular symptoms along the ulnar pathway. Therapy Prognosis: Good          Post-Pain Assessment:       Pain Rating (0-10 pain scale):   0/10   Location and pain description same as pre-treatment unless indicated.    Action: [x] NA   [] Perform HEP  [] Meds as prescribed  [] Modalities as prescribed   [] Call Physician     GOALS   Patient Goal(s): Patient Goals : Decrease pain    Short Term Goals Completed by 2 weeks Goal Status   STG 1 The pt will demonstrate improved postural awareness requiring <25% VC's with exercises New   STG 2 Decrease Cervical/R UE radicular pain 50% to assist with improved functional gains/writing New     Long Term Goals Completed by 4-6 weeks Goal Status   LTG 1 Indep HEP for symptom management New   LTG 2 Pt demo improved overall function by reporting greater than 75% per functional survey score New   LTG 3 Pain-free Cervical/scapular AROM to WNL allowing an increase in ADL tolerance. New   LTG 4 Improve scapular/shoulder strength 4+/5 to allow patient to return to overhead reach and lift. New   Plan:  Frequency/Duration:  Plan  Plan Frequency: 2-3  Plan weeks: 4-6  Current Treatment Recommendations: Strengthening, ROM, Modalities, Home exercise program, Manual, Neuromuscular re-education, Dry needling  Modalities: Heat/Cold, Mechanical Traction, Ultrasound, E-stim - unattended  Pt to continue current HEP. See objective section for any therapeutic exercise changes, additions or modifications this date.     Therapy Time:      PT Individual Minutes  Time In: 1000  Time Out: 1050  Minutes: 50  Timed Code Treatment Minutes: 40 Minutes  Procedure Minutes:10 min estim/CP  Timed Activity Minutes Units   Ther Ex 33 2   Manual  7 1     Electronically signed by Miguel Zacarias, PT on 1/9/23 at 11:01 AM EST

## 2023-01-11 ENCOUNTER — HOSPITAL ENCOUNTER (OUTPATIENT)
Dept: PHYSICAL THERAPY | Age: 53
Setting detail: THERAPIES SERIES
Discharge: HOME | End: 2023-01-11
Payer: COMMERCIAL

## 2023-01-11 PROCEDURE — 97110 THERAPEUTIC EXERCISES: CPT

## 2023-01-11 PROCEDURE — 97140 MANUAL THERAPY 1/> REGIONS: CPT

## 2023-01-11 ASSESSMENT — PAIN SCALES - GENERAL: PAINLEVEL_OUTOF10: 2

## 2023-01-11 ASSESSMENT — PAIN DESCRIPTION - DESCRIPTORS: DESCRIPTORS: SORE;NAGGING

## 2023-01-11 ASSESSMENT — PAIN DESCRIPTION - LOCATION: LOCATION: SHOULDER;NECK

## 2023-01-11 ASSESSMENT — PAIN DESCRIPTION - ORIENTATION: ORIENTATION: RIGHT

## 2023-01-11 NOTE — PROGRESS NOTES
[address] [place] [address]  QNXQM:[phone]  Treatment Note        Date: 2023  Patient: Martha Bernal  : 1970   Confirmed: Yes  MRN: [id number]  Referring Provider: Cherie Shaffer MD      Medical Diagnosis: Strain of unspecified muscle, fascia and tendon at shoulder and upper arm level, right arm, initial encounter [K55.151A]      Treatment Diagnosis: R Cervical/scapular complex pain with radicular symptoms along the ulnar pathway. Visit Information:  Insurance: Payor: MED[employer] / Plan: MED[employer] [address] / Product Type: *No Product type* /   PT Visit Information  Onset Date: 23  PT Insurance Information: Medical [place]  Total # of Visits Approved:  (BMN)  Total # of Visits to Date: 3  No Show: 0  Canceled Appointment: 0  Progress Note Counter: 3/12    Subjective Information:  Subjective: Pt reports mostly soreness this date without severe pain. Numbness in R hand decreasing however difficulty to hold pencil and modifies computer work. HEP Compliance:  [x] Good [] Fair [] Poor [] Reports not doing due to:    Pain Screening  Patient Currently in Pain: Yes  Pain Level: 2  Pain Location: Shoulder, Neck  Pain Orientation: Right  Pain Descriptors: Sore, Nagging    Treatment:  Exercises:  Exercises  Exercise 1: Scap retraction 5s x 10, chin tuck 5s x 10, levator stretch 10s x 10 transition to HEP  Exercise 2: ulnar nerve glide x 5 with R cervical SB  Exercise 3: pec stretch with foam wedge 5 min  Exercise 4: supine chest pull GTB x 15 diag x 15  Exercise 5: supine B shoulder ER GTB x 20  Exercise 6: supine serratus punch GTB x 15  Exercise 7: prone midrow x 10 , horizontal abd wtih thumb up/down  Exercise 8: posture ed: alignment while sitting.   Exercise 15: HEP: Scap retraction 5s x 10, chin tuck 5s x 10, levator stretch 10s x 10  Treatment Reasoning  Limitations addressed: Strength, Mobility, Posture, Pain modulation  Functional ability(s) targeted: Reaching overhead, Tolerance to age appropriate activities, Return to work tasks    Manual:   Manual Therapy  Other: DN/Education ( 7 mins) to normalize inflammation, dec pain and provide improved biomechanics ( see DN diagram for sites needled that will be scanned into EMR upon D/C)     *Indicates exercise, modality, or manual techniques to be initiated when appropriate    Objective Measures:      Assessment: Body Structures, Functions, Activity Limitations Requiring Skilled Therapeutic Intervention: Decreased ROM, Decreased strength, Decreased tolerance to work activity, Decreased posture, Increased pain  Assessment: Initiate scapular strenthening and UBE to improve stability. Issued progressive HEP. Continue with IDN to assist with pain control and decreasing inflammation. Held estim due to no pain post treatment. Treatment Diagnosis: R Cervical/scapular complex pain with radicular symptoms along the ulnar pathway. Therapy Prognosis: Good          Post-Pain Assessment:       Pain Rating (0-10 pain scale):   no pain reported/10   Location and pain description same as pre-treatment unless indicated. Action: [x] NA   [] Perform HEP  [] Meds as prescribed  [] Modalities as prescribed   [] Call Physician     GOALS   Patient Goal(s): Patient Goals : Decrease pain    Short Term Goals Completed by 2 weeks Goal Status   STG 1 The pt will demonstrate improved postural awareness requiring <25% VC's with exercises New   STG 2 Decrease Cervical/R UE radicular pain 50% to assist with improved functional gains/writing New     Long Term Goals Completed by 4-6 weeks Goal Status   LTG 1 Indep HEP for symptom management New   LTG 2 Pt demo improved overall function by reporting greater than 75% per functional survey score New   LTG 3 Pain-free Cervical/scapular AROM to WNL allowing an increase in ADL tolerance.  New   LTG 4 Improve scapular/shoulder strength 4+/5 to allow patient to return to overhead reach and lift. New          Plan:  Frequency/Duration:  Plan  Plan Frequency: 2-3  Plan weeks: 4-6  Current Treatment Recommendations: Strengthening, ROM, Modalities, Home exercise program, Manual, Neuromuscular re-education, Dry needling  Modalities: Heat/Cold, Mechanical Traction, Ultrasound, E-stim - unattended  Pt to continue current HEP. See objective section for any therapeutic exercise changes, additions or modifications this date.     Therapy Time:      PT Individual Minutes  Time In: 1017  Time Out: 1100  Minutes: 43  Timed Code Treatment Minutes: 40 Minutes  Procedure Minutes:NA  Timed Activity Minutes Units   Ther Ex 33 2   Manual  7 1     Electronically signed by Charly Engel PT on 1/11/23 at 12:25 PM EST

## 2023-01-12 ENCOUNTER — APPOINTMENT (OUTPATIENT)
Dept: PHYSICAL THERAPY | Age: 53
End: 2023-01-12
Payer: COMMERCIAL

## 2023-01-13 ENCOUNTER — HOSPITAL ENCOUNTER (OUTPATIENT)
Dept: PHYSICAL THERAPY | Age: 53
Setting detail: THERAPIES SERIES
Discharge: HOME | End: 2023-01-13
Payer: COMMERCIAL

## 2023-01-13 PROCEDURE — 97110 THERAPEUTIC EXERCISES: CPT

## 2023-01-13 PROCEDURE — G0283 ELEC STIM OTHER THAN WOUND: HCPCS

## 2023-01-13 ASSESSMENT — PAIN DESCRIPTION - LOCATION: LOCATION: SHOULDER;NECK

## 2023-01-13 ASSESSMENT — PAIN DESCRIPTION - DESCRIPTORS: DESCRIPTORS: SORE

## 2023-01-13 ASSESSMENT — PAIN DESCRIPTION - ORIENTATION: ORIENTATION: RIGHT

## 2023-01-13 ASSESSMENT — PAIN SCALES - GENERAL: PAINLEVEL_OUTOF10: 1

## 2023-01-13 NOTE — PROGRESS NOTES
[address] [place]  [facility][phone]  Treatment Note        Date: 2023  Patient: Sakina Antonio  : 1970   Confirmed: Yes  MRN: [id number]  Referring Provider: Poppy Eden MD      Medical Diagnosis: Strain of unspecified muscle, fascia and tendon at shoulder and upper arm level, right arm, initial encounter [Z17.491W]      Treatment Diagnosis: R Cervical/scapular complex pain with radicular symptoms along the ulnar pathway. Visit Information:  Insurance: Payor: MEDICAL M[employer]UAL / Plan: MEDICAL MUTUAL [address] / Product Type: *No Product type* /   PT Visit Information  Onset Date: 23  PT Insurance Information: Medical [place]  Total # of Visits Approved:  (BMN)  Total # of Visits to Date: 4  No Show: 0  Canceled Appointment: 0  Progress Note Counter:     Subjective Information:  Subjective: Pt reports worked out this am. Doing theraband HEP. Reports starting taking Gabapentin and feeling better and has 3 days left of prednisone. Able to complete HEP with decrease pain afterward. HEP Compliance:  [x] Good [] Fair [] Poor [] Reports not doing due to:    Pain Screening  Patient Currently in Pain: Yes  Pain Level: 1  Pain Location: Shoulder, Neck  Pain Orientation: Right  Pain Descriptors: Sore    Treatment:  Exercises:  Exercises  Exercise 1: Scap retraction 5s x 3, chin tuck 5s x 3, levator stretch 10s x 3 transition to HEP  Exercise 2: ulnar nerve glide x with R cervical SB  Exercise 3: core walk out 25# x 3 laps each direction.   Exercise 4: supine chest pull GTB x 15 diag x 5 emphasis on decreasing compensation  Exercise 5: supine B shoulder ER GTB  watch shoulder abduction  Exercise 6: supine serratus punch GTB x 5 emphasis on decreasing compensation with elbow flex  Exercise 7: prone midrow x 10 , horizontal abd wtih thumb up/down x 10  Exercise 15: HEP: Scap retraction 5s x 10, chin tuck 5s x 10, levator stretch 10s x 10  Treatment Reasoning  Limitations addressed: Strength, Mobility, Posture, Pain modulation  Functional ability(s) targeted: Reaching overhead, Tolerance to age appropriate activities, Return to work tasks    Modalities:  Cryotherapy (CPT 07719)  Patient Position: Seated  Number Minutes Cryotherapy: 10 min  Cryotherapy location: Cervical, Shoulder  Post treatment skin assessment: Intact  Limitations addressed: Pain modulation  Functional ability(s) targeted: Tolerance to age appropriate activities, Reaching overhead  Electric stimulation, unattended (CPT 15532) /  (Medicare)  Patient Position: Seated  E-stim location: Cervical, Shoulder  E-stim type: Interferential (IFC)  E-stim via: 4 Electrode Pads  Limitations addressed: Pain modulation       *Indicates exercise, modality, or manual techniques to be initiated when appropriate    Objective Measures:      STG 1 Current Status[de-identified] Pt with improved posture overall with less than 25% verbal cues required to correct. STG 2 Current Status[de-identified] 1/10 pain R cervical/RUE without radicular symptoms this date. Improved overall function, writing continues to be a challenge. LTG 1 Current Status[de-identified] Indep with HEP and managing symptoms. Pt needs less than 20% cues for decreasing compensation on newer initiated ex's. LTG 2 Current Status[de-identified] NDI 4/50=92% functional  LTG 3 Current Status[de-identified] Flex 40, Ext 40, SB 40, Rotation 50B without pain. Improved scapular mobility full range with min decreased timing and early initiation on R  LTG 4 Current Status[de-identified] 4+/5 Shoulder/scap and able to reach overhead without difficulty. Challenged with heavier loads on descent. Assessment: Body Structures, Functions, Activity Limitations Requiring Skilled Therapeutic Intervention: Decreased ROM, Decreased strength, Decreased tolerance to work activity, Decreased posture, Increased pain  Assessment: Pt has met or partially met all goals.  Pt declined IDN this date adn request to conclude with CP/ESTIM. Challenged with fine motor tasks such as writing and lowering heavier weight from over head, however improving. Pt given HEP to address remaining concerns. Will hold one week to monitor progress with Indep progression and determine if follow up or DC needed. Treatment Diagnosis: R Cervical/scapular complex pain with radicular symptoms along the ulnar pathway. Therapy Prognosis: Good  Patient Education: HEP for fine motor improvements and overall stability of the shoulder and scapular complex. Post-Pain Assessment:       Pain Rating (0-10 pain scale):   0/10   Location and pain description same as pre-treatment unless indicated. Action: [x] NA   [] Perform HEP  [] Meds as prescribed  [] Modalities as prescribed   [] Call Physician     GOALS   Patient Goal(s): Patient Goals : Decrease pain    Short Term Goals Completed by 2 weeks Goal Status   STG 1 The pt will demonstrate improved postural awareness requiring <25% VC's with exercises Met   STG 2 Decrease Cervical/R UE radicular pain 50% to assist with improved functional gains/writing Partially met     Long Term Goals Completed by 4-6 weeks Goal Status   LTG 1 Indep HEP for symptom management New   LTG 2 Pt demo improved overall function by reporting greater than 75% per functional survey score Met   LTG 3 Pain-free Cervical/scapular AROM to WNL allowing an increase in ADL tolerance. New   LTG 4 Improve scapular/shoulder strength 4+/5 to allow patient to return to overhead reach and lift. Partially met     Plan:  Frequency/Duration:  Plan  Plan Frequency: 2-3  Plan weeks: 4-6  Current Treatment Recommendations: Strengthening, ROM, Modalities, Home exercise program, Manual, Neuromuscular re-education, Dry needling  Modalities: Heat/Cold, Mechanical Traction, Ultrasound, E-stim - unattended  Additional Comments: Hold 1 week to follow up on further skilled need. Pt to continue current HEP.   See objective section for any therapeutic exercise changes, additions or modifications this date.     Therapy Time:      PT Individual Minutes  Time In: 6368  Time Out: 2483  Minutes: 41  Timed Code Treatment Minutes: 31 Minutes  Procedure Minutes:10 min Estim/CP  Timed Activity Minutes Units   Ther Ex 31 2   Electronically signed by Shahida Andino PT on 1/13/23 at 10:51 AM EST

## 2023-01-16 ENCOUNTER — HOSPITAL ENCOUNTER (OUTPATIENT)
Dept: PHYSICAL THERAPY | Age: 53
Setting detail: THERAPIES SERIES
Discharge: HOME | End: 2023-01-16
Payer: COMMERCIAL

## 2023-01-19 ENCOUNTER — APPOINTMENT (OUTPATIENT)
Dept: PHYSICAL THERAPY | Age: 53
End: 2023-01-19
Payer: COMMERCIAL

## 2023-01-23 ENCOUNTER — APPOINTMENT (OUTPATIENT)
Dept: PHYSICAL THERAPY | Age: 53
End: 2023-01-23
Payer: COMMERCIAL

## 2023-01-26 ENCOUNTER — APPOINTMENT (OUTPATIENT)
Dept: PHYSICAL THERAPY | Age: 53
End: 2023-01-26
Payer: COMMERCIAL

## 2023-05-02 ENCOUNTER — PATIENT MESSAGE (OUTPATIENT)
Dept: PRIMARY CARE | Facility: CLINIC | Age: 53
End: 2023-05-02

## 2023-05-02 DIAGNOSIS — E29.1 HYPOGONADISM IN MALE: ICD-10-CM

## 2023-05-02 RX ORDER — TESTOSTERONE CYPIONATE 200 MG/ML
100 INJECTION, SOLUTION INTRAMUSCULAR
COMMUNITY
End: 2023-05-02 | Stop reason: SDUPTHER

## 2023-05-02 RX ORDER — SEMAGLUTIDE 1.34 MG/ML
0.5 INJECTION, SOLUTION SUBCUTANEOUS
COMMUNITY
End: 2023-05-02 | Stop reason: SDUPTHER

## 2023-05-02 RX ORDER — TESTOSTERONE CYPIONATE 200 MG/ML
100 INJECTION, SOLUTION INTRAMUSCULAR
Qty: 2 ML | Refills: 5 | Status: SHIPPED | OUTPATIENT
Start: 2023-05-02 | End: 2023-10-30 | Stop reason: SDUPTHER

## 2023-05-02 RX ORDER — SEMAGLUTIDE 1.34 MG/ML
0.5 INJECTION, SOLUTION SUBCUTANEOUS
Qty: 1 EACH | Refills: 5 | Status: SHIPPED | OUTPATIENT
Start: 2023-05-02 | End: 2024-01-08 | Stop reason: SDUPTHER

## 2023-05-02 NOTE — TELEPHONE ENCOUNTER
From: Gerard Miranda  To: Jerica Sands MD  Sent: 5/2/2023 10:51 AM EDT  Subject: Ozempic and Testosterone    Hi Dr. Sands,    I need refills on Ozempic and Testosterone.     Thanks,  Gerard Miranda  [phone]

## 2023-09-05 ENCOUNTER — NURSE TRIAGE (OUTPATIENT)
Dept: OTHER | Facility: CLINIC | Age: 53
End: 2023-09-05

## 2023-09-05 NOTE — TELEPHONE ENCOUNTER
Location of patient: [address] call from Anjana at [employer] with [employer]. Subjective: Caller states \"I am having fatigue\"     Current Symptoms: Fatigued. His CPAP machine is not working. Since the CPAP is not working it is causing his regurgitation to get worse while sleeping, waking him up and causing him to choke     Onset: several months ago; worsening    Associated Symptoms: reduced activity, increased wakefulness    LMP: NA Pregnant: NA    Recommended disposition: See in Office Today    Care advice provided, patient verbalizes understanding; denies any other questions or concerns; instructed to call back for any new or worsening symptoms. Patient/Caller agrees with recommended disposition; writer provided warm transfer to [employer] at [employer] for appointment scheduling    Attention Provider: Thank you for allowing me to participate in the care of your patient. The patient was connected to triage in response to information provided to the ECC/PSC. Please do not respond through this encounter as the response is not directed to a shared pool.     Reason for Disposition   MODERATE weakness (i.e., interferes with work, school, normal activities) and persists > 3 days    Protocols used: Weakness (Generalized) and Fatigue-ADULT-OH

## 2023-09-18 ENCOUNTER — PATIENT MESSAGE (OUTPATIENT)
Dept: PRIMARY CARE | Facility: CLINIC | Age: 53
End: 2023-09-18

## 2023-09-18 ENCOUNTER — LAB (OUTPATIENT)
Dept: LAB | Facility: LAB | Age: 53
End: 2023-09-18
Payer: COMMERCIAL

## 2023-09-18 DIAGNOSIS — R73.9 HYPERGLYCEMIA: ICD-10-CM

## 2023-09-18 DIAGNOSIS — Z12.5 PROSTATE CANCER SCREENING: ICD-10-CM

## 2023-09-18 DIAGNOSIS — E29.1 HYPOGONADISM IN MALE: ICD-10-CM

## 2023-09-18 DIAGNOSIS — G47.30 SLEEP APNEA, UNSPECIFIED TYPE: ICD-10-CM

## 2023-09-18 DIAGNOSIS — E03.9 HYPOTHYROIDISM, UNSPECIFIED TYPE: ICD-10-CM

## 2023-09-18 LAB
ALANINE AMINOTRANSFERASE (SGPT) (U/L) IN SER/PLAS: 32 U/L (ref 10–52)
ALBUMIN (G/DL) IN SER/PLAS: 4.4 G/DL (ref 3.4–5)
ALKALINE PHOSPHATASE (U/L) IN SER/PLAS: 41 U/L (ref 33–120)
ANION GAP IN SER/PLAS: 13 MMOL/L (ref 10–20)
ASPARTATE AMINOTRANSFERASE (SGOT) (U/L) IN SER/PLAS: 33 U/L (ref 9–39)
BILIRUBIN TOTAL (MG/DL) IN SER/PLAS: 0.4 MG/DL (ref 0–1.2)
CALCIUM (MG/DL) IN SER/PLAS: 9.4 MG/DL (ref 8.6–10.3)
CARBON DIOXIDE, TOTAL (MMOL/L) IN SER/PLAS: 27 MMOL/L (ref 21–32)
CHLORIDE (MMOL/L) IN SER/PLAS: 101 MMOL/L (ref 98–107)
CHOLESTEROL (MG/DL) IN SER/PLAS: 166 MG/DL (ref 0–199)
CHOLESTEROL IN HDL (MG/DL) IN SER/PLAS: 45.2 MG/DL
CHOLESTEROL/HDL RATIO: 3.7
CREATININE (MG/DL) IN SER/PLAS: 1.36 MG/DL (ref 0.5–1.3)
ERYTHROCYTE DISTRIBUTION WIDTH (RATIO) BY AUTOMATED COUNT: 13.1 % (ref 11.5–14.5)
ERYTHROCYTE MEAN CORPUSCULAR HEMOGLOBIN CONCENTRATION (G/DL) BY AUTOMATED: 35.1 G/DL (ref 32–36)
ERYTHROCYTE MEAN CORPUSCULAR VOLUME (FL) BY AUTOMATED COUNT: 86 FL (ref 80–100)
ERYTHROCYTES (10*6/UL) IN BLOOD BY AUTOMATED COUNT: 5.87 X10E12/L (ref 4.5–5.9)
GFR MALE: 62 ML/MIN/1.73M2
GLUCOSE (MG/DL) IN SER/PLAS: 99 MG/DL (ref 74–99)
HEMATOCRIT (%) IN BLOOD BY AUTOMATED COUNT: 50.2 % (ref 41–52)
HEMOGLOBIN (G/DL) IN BLOOD: 17.6 G/DL (ref 13.5–17.5)
LDL: 53 MG/DL (ref 0–99)
LEUKOCYTES (10*3/UL) IN BLOOD BY AUTOMATED COUNT: 9.9 X10E9/L (ref 4.4–11.3)
NON HDL CHOLESTEROL: 121 MG/DL
PLATELETS (10*3/UL) IN BLOOD AUTOMATED COUNT: 254 X10E9/L (ref 150–450)
POTASSIUM (MMOL/L) IN SER/PLAS: 3.7 MMOL/L (ref 3.5–5.3)
PROSTATE SPECIFIC ANTIGEN,SCREEN: 1.04 NG/ML (ref 0–4)
PROTEIN TOTAL: 7.3 G/DL (ref 6.4–8.2)
SODIUM (MMOL/L) IN SER/PLAS: 137 MMOL/L (ref 136–145)
THYROTROPIN (MIU/L) IN SER/PLAS BY DETECTION LIMIT <= 0.05 MIU/L: 0.58 MIU/L (ref 0.44–3.98)
THYROXINE (T4) FREE (NG/DL) IN SER/PLAS: 0.65 NG/DL (ref 0.61–1.12)
TRIGLYCERIDE (MG/DL) IN SER/PLAS: 337 MG/DL (ref 0–149)
UREA NITROGEN (MG/DL) IN SER/PLAS: 24 MG/DL (ref 6–23)
VLDL: 67 MG/DL (ref 0–40)

## 2023-09-18 PROCEDURE — 80053 COMPREHEN METABOLIC PANEL: CPT

## 2023-09-18 PROCEDURE — 85027 COMPLETE CBC AUTOMATED: CPT

## 2023-09-18 PROCEDURE — 80061 LIPID PANEL: CPT

## 2023-09-18 PROCEDURE — 84153 ASSAY OF PSA TOTAL: CPT

## 2023-09-18 PROCEDURE — 84402 ASSAY OF FREE TESTOSTERONE: CPT

## 2023-09-18 PROCEDURE — 84443 ASSAY THYROID STIM HORMONE: CPT

## 2023-09-18 PROCEDURE — 36415 COLL VENOUS BLD VENIPUNCTURE: CPT

## 2023-09-18 PROCEDURE — 84439 ASSAY OF FREE THYROXINE: CPT

## 2023-09-18 PROCEDURE — 84403 ASSAY OF TOTAL TESTOSTERONE: CPT

## 2023-09-19 DIAGNOSIS — R39.198 DIFFICULTY IN URINATION: Primary | ICD-10-CM

## 2023-09-19 DIAGNOSIS — E29.9 TESTICULAR DYSFUNCTION, UNSPECIFIED: ICD-10-CM

## 2023-09-19 DIAGNOSIS — E78.5 HYPERLIPIDEMIA, UNSPECIFIED HYPERLIPIDEMIA TYPE: ICD-10-CM

## 2023-09-19 RX ORDER — ATORVASTATIN CALCIUM 10 MG/1
1 TABLET, FILM COATED ORAL DAILY
COMMUNITY
Start: 2020-09-21 | End: 2023-09-20 | Stop reason: SDUPTHER

## 2023-09-19 RX ORDER — ATORVASTATIN CALCIUM 10 MG/1
10 TABLET, FILM COATED ORAL DAILY
Qty: 90 TABLET | Refills: 3 | OUTPATIENT
Start: 2023-09-19

## 2023-09-20 DIAGNOSIS — E78.49 OTHER HYPERLIPIDEMIA: Primary | ICD-10-CM

## 2023-09-20 RX ORDER — ATORVASTATIN CALCIUM 10 MG/1
10 TABLET, FILM COATED ORAL DAILY
Qty: 90 TABLET | Refills: 3 | Status: SHIPPED | OUTPATIENT
Start: 2023-09-20

## 2023-09-22 LAB
TESTOSTERONE FREE (CHAN): 413.8 PG/ML (ref 35–155)
TESTOSTERONE,TOTAL,LC-MS/MS: 1239 NG/DL (ref 250–1100)

## 2023-10-03 ENCOUNTER — APPOINTMENT (OUTPATIENT)
Dept: PRIMARY CARE | Facility: CLINIC | Age: 53
End: 2023-10-03
Payer: COMMERCIAL

## 2023-10-06 ENCOUNTER — LAB (OUTPATIENT)
Dept: LAB | Facility: LAB | Age: 53
End: 2023-10-06
Payer: COMMERCIAL

## 2023-10-06 ENCOUNTER — PATIENT MESSAGE (OUTPATIENT)
Dept: PRIMARY CARE | Facility: CLINIC | Age: 53
End: 2023-10-06

## 2023-10-06 DIAGNOSIS — E78.5 HYPERLIPIDEMIA, UNSPECIFIED HYPERLIPIDEMIA TYPE: ICD-10-CM

## 2023-10-06 DIAGNOSIS — N52.9 ERECTILE DYSFUNCTION, UNSPECIFIED ERECTILE DYSFUNCTION TYPE: ICD-10-CM

## 2023-10-06 PROCEDURE — 36415 COLL VENOUS BLD VENIPUNCTURE: CPT

## 2023-10-06 PROCEDURE — 83036 HEMOGLOBIN GLYCOSYLATED A1C: CPT

## 2023-10-07 LAB
EST. AVERAGE GLUCOSE BLD GHB EST-MCNC: 108 MG/DL
HBA1C MFR BLD: 5.4 %

## 2023-10-07 RX ORDER — SILDENAFIL 100 MG/1
TABLET, FILM COATED ORAL
COMMUNITY
End: 2023-10-07 | Stop reason: SDUPTHER

## 2023-10-07 RX ORDER — LEVOTHYROXINE SODIUM 125 UG/1
125 TABLET ORAL DAILY
COMMUNITY
End: 2023-10-23 | Stop reason: SDUPTHER

## 2023-10-07 RX ORDER — METFORMIN HYDROCHLORIDE 500 MG/1
500 TABLET ORAL DAILY
COMMUNITY

## 2023-10-07 RX ORDER — SILDENAFIL 100 MG/1
TABLET, FILM COATED ORAL
Qty: 10 TABLET | Refills: 3 | Status: SHIPPED | OUTPATIENT
Start: 2023-10-07 | End: 2024-03-20 | Stop reason: SDUPTHER

## 2023-10-19 ENCOUNTER — CLINICAL SUPPORT (OUTPATIENT)
Dept: SLEEP MEDICINE | Facility: HOSPITAL | Age: 53
End: 2023-10-19
Payer: COMMERCIAL

## 2023-10-19 DIAGNOSIS — G47.33 OBSTRUCTIVE SLEEP APNEA (ADULT) (PEDIATRIC): ICD-10-CM

## 2023-10-19 DIAGNOSIS — G47.30 SLEEP APNEA, UNSPECIFIED TYPE: ICD-10-CM

## 2023-10-19 PROBLEM — E66.811 OBESITY, CLASS I, BMI 30-34.9: Status: ACTIVE | Noted: 2018-04-27

## 2023-10-19 PROBLEM — M65.4 RADIAL STYLOID TENOSYNOVITIS: Status: ACTIVE | Noted: 2017-09-26

## 2023-10-19 PROBLEM — E07.9 DISORDER OF THYROID: Status: ACTIVE | Noted: 2017-09-19

## 2023-10-19 PROBLEM — F32.A DEPRESSION: Status: ACTIVE | Noted: 2017-09-28

## 2023-10-19 PROBLEM — D22.9 MELANOCYTIC NEVUS: Status: ACTIVE | Noted: 2023-10-19

## 2023-10-19 PROBLEM — R42 DIZZINESS AND GIDDINESS: Status: ACTIVE | Noted: 2017-09-15

## 2023-10-19 PROBLEM — L98.8 WRINKLING OF FACIAL SKIN: Status: ACTIVE | Noted: 2023-10-19

## 2023-10-19 PROBLEM — E78.00 PURE HYPERCHOLESTEROLEMIA: Status: ACTIVE | Noted: 2017-09-21

## 2023-10-19 PROBLEM — U07.1 DISEASE DUE TO SEVERE ACUTE RESPIRATORY SYNDROME CORONAVIRUS 2 (SARS-COV-2): Status: ACTIVE | Noted: 2023-10-19

## 2023-10-19 PROBLEM — Z04.9 CONDITION NOT FOUND: Status: ACTIVE | Noted: 2017-09-15

## 2023-10-19 PROBLEM — E66.9 OBESITY, CLASS I, BMI 30-34.9: Status: ACTIVE | Noted: 2018-04-27

## 2023-10-19 PROBLEM — Z82.49 FAMILY HX OF ISCHEM HEART DIS AND OTH DIS OF THE CIRC SYS: Status: ACTIVE | Noted: 2017-09-21

## 2023-10-19 PROBLEM — E29.1 HYPOGONADISM MALE: Status: ACTIVE | Noted: 2023-10-19

## 2023-10-19 PROBLEM — E66.01 SEVERE OBESITY (MULTI): Status: ACTIVE | Noted: 2023-10-19

## 2023-10-19 PROBLEM — S05.11XA ECCHYMOSIS OF RIGHT EYE: Status: ACTIVE | Noted: 2020-02-07

## 2023-10-19 PROBLEM — H50.21 HYPOTROPIA OF RIGHT EYE: Status: ACTIVE | Noted: 2020-04-30

## 2023-10-19 PROBLEM — S02.119A: Status: ACTIVE | Noted: 2023-10-19

## 2023-10-19 PROBLEM — L82.1 SEBORRHEIC KERATOSIS: Status: ACTIVE | Noted: 2023-10-19

## 2023-10-19 PROBLEM — I25.10 CORONARY ATHEROSCLEROSIS: Status: ACTIVE | Noted: 2017-09-19

## 2023-10-19 PROBLEM — E03.8 OTHER SPECIFIED HYPOTHYROIDISM: Status: ACTIVE | Noted: 2019-08-15

## 2023-10-19 PROBLEM — E66.3 OVERWEIGHT: Status: ACTIVE | Noted: 2017-09-21

## 2023-10-19 PROBLEM — H43.811 POSTERIOR VITREOUS DETACHMENT OF RIGHT EYE: Status: ACTIVE | Noted: 2020-02-07

## 2023-10-19 PROBLEM — I95.9 HYPOTENSION: Status: ACTIVE | Noted: 2017-09-21

## 2023-10-19 PROBLEM — R73.9 HYPERGLYCEMIA: Status: ACTIVE | Noted: 2023-10-19

## 2023-10-19 PROBLEM — N52.9 ERECTILE DYSFUNCTION: Status: ACTIVE | Noted: 2023-10-19

## 2023-10-19 PROBLEM — S46.919A STRAIN OF SHOULDER: Status: ACTIVE | Noted: 2023-10-19

## 2023-10-19 PROBLEM — R07.9 CHEST PAIN, UNSPECIFIED: Status: ACTIVE | Noted: 2017-09-21

## 2023-10-19 PROBLEM — F41.9 ANXIETY: Status: ACTIVE | Noted: 2017-09-28

## 2023-10-19 PROBLEM — S02.31XA: Status: ACTIVE | Noted: 2020-02-07

## 2023-10-19 PROCEDURE — 95810 POLYSOM 6/> YRS 4/> PARAM: CPT | Mod: 52 | Performed by: SPECIALIST

## 2023-10-19 RX ORDER — GABAPENTIN 100 MG/1
CAPSULE ORAL
COMMUNITY
Start: 2023-01-07 | End: 2023-10-23 | Stop reason: ALTCHOICE

## 2023-10-19 RX ORDER — KETOROLAC TROMETHAMINE 60 MG/2ML
INJECTION, SOLUTION INTRAMUSCULAR
COMMUNITY
Start: 2021-12-01 | End: 2023-10-23 | Stop reason: ALTCHOICE

## 2023-10-19 RX ORDER — BENZOYL PEROXIDE 50 MG/ML
LIQUID TOPICAL
COMMUNITY
Start: 2022-12-20 | End: 2023-10-23 | Stop reason: ALTCHOICE

## 2023-10-19 RX ORDER — HYDROCODONE BITARTRATE AND ACETAMINOPHEN 5; 325 MG/1; MG/1
1 TABLET ORAL 4 TIMES DAILY
COMMUNITY
Start: 2021-12-01 | End: 2023-10-23 | Stop reason: ALTCHOICE

## 2023-10-19 RX ORDER — NEOMYCIN SULFATE, POLYMYXIN B SULFATE, AND DEXAMETHASONE 3.5; 10000; 1 MG/G; [USP'U]/G; MG/G
OINTMENT OPHTHALMIC
COMMUNITY
Start: 2023-05-11 | End: 2023-10-23 | Stop reason: ALTCHOICE

## 2023-10-19 RX ORDER — HYDROQUINONE 40 MG/G
CREAM TOPICAL 2 TIMES DAILY
COMMUNITY
Start: 2023-05-03 | End: 2023-10-23 | Stop reason: ALTCHOICE

## 2023-10-19 RX ORDER — BUSPIRONE HYDROCHLORIDE 5 MG/1
5 TABLET ORAL
COMMUNITY
Start: 2020-01-24 | End: 2023-10-23 | Stop reason: ALTCHOICE

## 2023-10-19 RX ORDER — MELOXICAM 7.5 MG/1
TABLET ORAL
COMMUNITY
Start: 2022-11-27 | End: 2023-10-23 | Stop reason: ALTCHOICE

## 2023-10-19 RX ORDER — OMEPRAZOLE 40 MG/1
1 CAPSULE, DELAYED RELEASE ORAL DAILY
COMMUNITY
Start: 2020-03-02 | End: 2024-03-20 | Stop reason: SDUPTHER

## 2023-10-19 RX ORDER — DEXAMETHASONE 4 MG/1
TABLET ORAL
COMMUNITY
Start: 2023-06-13 | End: 2023-10-23 | Stop reason: ALTCHOICE

## 2023-10-19 RX ORDER — NAPROXEN 500 MG/1
TABLET ORAL
COMMUNITY
Start: 2023-01-03 | End: 2023-10-23 | Stop reason: ALTCHOICE

## 2023-10-19 RX ORDER — CLINDAMYCIN PHOSPHATE 10 UG/ML
LOTION TOPICAL 2 TIMES DAILY
COMMUNITY
Start: 2022-12-20 | End: 2023-10-23 | Stop reason: ALTCHOICE

## 2023-10-19 RX ORDER — DOXYCYCLINE HYCLATE 50 MG/1
CAPSULE ORAL
COMMUNITY
Start: 2022-12-20 | End: 2023-10-23 | Stop reason: ALTCHOICE

## 2023-10-19 RX ORDER — METHOCARBAMOL 500 MG/1
TABLET, FILM COATED ORAL
COMMUNITY
Start: 2023-01-03 | End: 2023-10-23 | Stop reason: ALTCHOICE

## 2023-10-19 RX ORDER — DIPHENHYDRAMINE HYDROCHLORIDE 25 MG/1
25 CAPSULE ORAL EVERY 6 HOURS PRN
COMMUNITY
Start: 2023-06-13 | End: 2023-10-23 | Stop reason: ALTCHOICE

## 2023-10-19 RX ORDER — LIDOCAINE 50 MG/G
PATCH TOPICAL
COMMUNITY
Start: 2023-01-03 | End: 2023-10-23 | Stop reason: ALTCHOICE

## 2023-10-19 RX ORDER — KETOCONAZOLE 20 MG/G
CREAM TOPICAL DAILY
COMMUNITY
Start: 2022-12-20 | End: 2023-10-23 | Stop reason: ALTCHOICE

## 2023-10-19 RX ORDER — CYCLOBENZAPRINE HCL 10 MG
TABLET ORAL
COMMUNITY
Start: 2022-11-27 | End: 2023-10-23 | Stop reason: ALTCHOICE

## 2023-10-19 RX ORDER — ESCITALOPRAM OXALATE 10 MG/1
10 TABLET ORAL
COMMUNITY
Start: 2020-02-04 | End: 2023-10-23 | Stop reason: ALTCHOICE

## 2023-10-20 VITALS — BODY MASS INDEX: 31.5 KG/M2 | WEIGHT: 220.02 LBS | HEIGHT: 70 IN

## 2023-10-20 ASSESSMENT — SLEEP AND FATIGUE QUESTIONNAIRES
SITING INACTIVE IN A PUBLIC PLACE LIKE A CLASS ROOM OR A MOVIE THEATER: MODERATE CHANCE OF DOZING
ESS TOTAL SCORE: 15
ESS-CHAD TOTAL SCORE: 15
HOW LIKELY ARE YOU TO NOD OFF OR FALL ASLEEP WHILE SITTING QUIETLY AFTER LUNCH WITHOUT ALCOHOL: MODERATE CHANCE OF DOZING
HOW LIKELY ARE YOU TO NOD OFF OR FALL ASLEEP IN A CAR, WHILE STOPPED FOR A FEW MINUTES IN TRAFFIC: SLIGHT CHANCE OF DOZING
HOW LIKELY ARE YOU TO NOD OFF OR FALL ASLEEP WHILE SITTING AND TALKING TO SOMEONE: WOULD NEVER DOZE
HOW LIKELY ARE YOU TO NOD OFF OR FALL ASLEEP WHEN YOU ARE A PASSENGER IN A CAR FOR AN HOUR WITHOUT A BREAK: MODERATE CHANCE OF DOZING
HOW LIKELY ARE YOU TO NOD OFF OR FALL ASLEEP WHILE WATCHING TV: HIGH CHANCE OF DOZING
HOW LIKELY ARE YOU TO NOD OFF OR FALL ASLEEP WHILE LYING DOWN TO REST IN THE AFTERNOON WHEN CIRCUMSTANCES PERMIT: HIGH CHANCE OF DOZING
HOW LIKELY ARE YOU TO NOD OFF OR FALL ASLEEP WHILE SITTING AND READING: MODERATE CHANCE OF DOZING

## 2023-10-20 NOTE — PROGRESS NOTES
[facility] TECH NOTE:     Patient: Gerard Miranda   MRN//AGE: [id number]  1970  53 y.o.   Technologist: Cynthia Espino   Room: 2   Service Date: 10/19/2023        Sleep Testing Location: [facility]     Sabattus: 15    TECHNOLOGIST SLEEP STUDY PROCEDURE NOTE:   This sleep study is being conducted according to the policies and procedures outlined by the AAS accreditation standards.  The sleep study procedure and processes involved during this appointment was explained to the patient/patient’s family, questions were answered. The patient/family verbalized understanding.      The patient is a 53 y.o. year old male scheduled for aDiagnostic PSG Split night with montage of:  PSG . he arrived for his appointment.      The study that was ultimately completed was aDiagnostic PSG Split night with montage of:  PSG .    The full study Was completed.  Patient questionnaires completed?: yes     Consents signed? yes    Initial Fall Risk Screening:     Gerard has not fallen in the last 6 months. his did not result in injury. Gerard does not have a fear of falling. He does not need assistance with sitting, standing, or walking. he does not need assistance walking in his home. he does not need assistance in an unfamiliar setting. The patient is notusing an assistive device.     Brief Study observations: Pt is a 53 year old male coming in for a split night study. Pt came alone. No unusual behavior was noted before or during the study. Tech unable to split pt due to lack of sleep by 2am.      Deviation to order/protocol and reason: Unable to Split due to not enough sleep by 2am.       If PAP, which was preferred mask/pressure/mode: N/A      Other:None    After the procedure, the patient/family was informed to ensure followup with ordering clinician for testing results.      Technologist: Cynthia Espino

## 2023-10-21 ENCOUNTER — PATIENT MESSAGE (OUTPATIENT)
Dept: PRIMARY CARE | Facility: CLINIC | Age: 53
End: 2023-10-21
Payer: COMMERCIAL

## 2023-10-21 DIAGNOSIS — E07.9 DISORDER OF THYROID: ICD-10-CM

## 2023-10-23 DIAGNOSIS — G47.30 SLEEP APNEA, UNSPECIFIED TYPE: Primary | ICD-10-CM

## 2023-10-23 RX ORDER — LEVOTHYROXINE SODIUM 125 UG/1
125 TABLET ORAL DAILY
Qty: 90 TABLET | Refills: 1 | Status: SHIPPED | OUTPATIENT
Start: 2023-10-23 | End: 2024-05-10 | Stop reason: SDUPTHER

## 2023-10-23 NOTE — TELEPHONE ENCOUNTER
From: Gerard Miranda  To: Jerica Sands MD  Sent: 10/21/2023 9:38 AM EDT  Subject: Synthroid    Need Synthroid refill, unable to request refill through [employer] [facility].

## 2023-10-26 DIAGNOSIS — G47.30 SLEEP APNEA, UNSPECIFIED TYPE: ICD-10-CM

## 2023-10-27 ENCOUNTER — PATIENT MESSAGE (OUTPATIENT)
Dept: PRIMARY CARE | Facility: CLINIC | Age: 53
End: 2023-10-27
Payer: COMMERCIAL

## 2023-10-27 DIAGNOSIS — E29.1 HYPOGONADISM IN MALE: ICD-10-CM

## 2023-10-30 RX ORDER — TESTOSTERONE CYPIONATE 200 MG/ML
100 INJECTION, SOLUTION INTRAMUSCULAR
Qty: 2 ML | Refills: 5 | Status: SHIPPED | OUTPATIENT
Start: 2023-10-30

## 2023-10-30 NOTE — TELEPHONE ENCOUNTER
From: Gerard Miranda  To: Jerica Sands MD  Sent: 10/27/2023 6:14 PM EDT  Subject: Testosterone Refill    I am out of testosterone and have not been notified by [facility] about a refill.

## 2023-11-07 ENCOUNTER — APPOINTMENT (OUTPATIENT)
Dept: PRIMARY CARE | Facility: CLINIC | Age: 53
End: 2023-11-07
Payer: COMMERCIAL

## 2024-01-08 RX ORDER — SEMAGLUTIDE 0.68 MG/ML
INJECTION, SOLUTION SUBCUTANEOUS
Qty: 3 ML | Refills: 1 | Status: SHIPPED | OUTPATIENT
Start: 2024-01-08 | End: 2024-03-20

## 2024-03-18 ENCOUNTER — TELEPHONE (OUTPATIENT)
Dept: PRIMARY CARE | Facility: CLINIC | Age: 54
End: 2024-03-18
Payer: COMMERCIAL

## 2024-03-18 NOTE — TELEPHONE ENCOUNTER
Gerard Miranda     1970    They are requesting additional information, so I am suggesting a P2P with Nini at [phone] Case ID [id number].  This is for MPI scheduled on 2024 and would need to be completed as soon as possible.  Please keep me updated.    Thank you    [email or web address]  Corporate Pre[employer] Specialist  [phone] phone  [phone]   fax

## 2024-03-20 ENCOUNTER — OFFICE VISIT (OUTPATIENT)
Dept: PRIMARY CARE | Facility: CLINIC | Age: 54
End: 2024-03-20
Payer: COMMERCIAL

## 2024-03-20 ENCOUNTER — LAB (OUTPATIENT)
Dept: LAB | Facility: LAB | Age: 54
End: 2024-03-20
Payer: COMMERCIAL

## 2024-03-20 VITALS
HEIGHT: 70 IN | OXYGEN SATURATION: 97 % | BODY MASS INDEX: 33.93 KG/M2 | DIASTOLIC BLOOD PRESSURE: 76 MMHG | HEART RATE: 62 BPM | SYSTOLIC BLOOD PRESSURE: 128 MMHG | RESPIRATION RATE: 16 BRPM | WEIGHT: 237 LBS | TEMPERATURE: 97.4 F

## 2024-03-20 DIAGNOSIS — N52.9 ERECTILE DYSFUNCTION, UNSPECIFIED ERECTILE DYSFUNCTION TYPE: ICD-10-CM

## 2024-03-20 DIAGNOSIS — R79.89 ELEVATED LIVER FUNCTION TESTS: ICD-10-CM

## 2024-03-20 DIAGNOSIS — Z00.00 PHYSICAL EXAM: ICD-10-CM

## 2024-03-20 DIAGNOSIS — M25.511 ACUTE PAIN OF RIGHT SHOULDER: ICD-10-CM

## 2024-03-20 DIAGNOSIS — E78.5 DYSLIPIDEMIA: ICD-10-CM

## 2024-03-20 DIAGNOSIS — R07.89 OTHER CHEST PAIN: ICD-10-CM

## 2024-03-20 DIAGNOSIS — K21.9 GASTROESOPHAGEAL REFLUX DISEASE WITHOUT ESOPHAGITIS: ICD-10-CM

## 2024-03-20 DIAGNOSIS — Z23 NEED FOR VACCINATION: ICD-10-CM

## 2024-03-20 DIAGNOSIS — S46.011A ROTATOR CUFF STRAIN, RIGHT, INITIAL ENCOUNTER: ICD-10-CM

## 2024-03-20 DIAGNOSIS — R07.89 OTHER CHEST PAIN: Primary | ICD-10-CM

## 2024-03-20 LAB
ALBUMIN SERPL BCP-MCNC: 4.7 G/DL (ref 3.4–5)
ALP SERPL-CCNC: 65 U/L (ref 33–120)
ALT SERPL W P-5'-P-CCNC: 148 U/L (ref 10–52)
ANION GAP SERPL CALC-SCNC: 10 MMOL/L (ref 10–20)
AST SERPL W P-5'-P-CCNC: 113 U/L (ref 9–39)
BASOPHILS # BLD AUTO: 0.06 X10*3/UL (ref 0–0.1)
BASOPHILS NFR BLD AUTO: 1 %
BILIRUB SERPL-MCNC: 0.4 MG/DL (ref 0–1.2)
BUN SERPL-MCNC: 19 MG/DL (ref 6–23)
CALCIUM SERPL-MCNC: 9.6 MG/DL (ref 8.6–10.3)
CHLORIDE SERPL-SCNC: 101 MMOL/L (ref 98–107)
CHOLEST SERPL-MCNC: 191 MG/DL (ref 0–199)
CHOLESTEROL/HDL RATIO: 3.2
CO2 SERPL-SCNC: 31 MMOL/L (ref 21–32)
CREAT SERPL-MCNC: 1.44 MG/DL (ref 0.5–1.3)
EGFRCR SERPLBLD CKD-EPI 2021: 57 ML/MIN/1.73M*2
EOSINOPHIL # BLD AUTO: 0.19 X10*3/UL (ref 0–0.7)
EOSINOPHIL NFR BLD AUTO: 3.1 %
ERYTHROCYTE [DISTWIDTH] IN BLOOD BY AUTOMATED COUNT: 13.7 % (ref 11.5–14.5)
EST. AVERAGE GLUCOSE BLD GHB EST-MCNC: 114 MG/DL
GLUCOSE SERPL-MCNC: 104 MG/DL (ref 74–99)
HBA1C MFR BLD: 5.6 %
HCT VFR BLD AUTO: 51.2 % (ref 41–52)
HDLC SERPL-MCNC: 58.8 MG/DL
HGB BLD-MCNC: 17.1 G/DL (ref 13.5–17.5)
IMM GRANULOCYTES # BLD AUTO: 0.01 X10*3/UL (ref 0–0.7)
IMM GRANULOCYTES NFR BLD AUTO: 0.2 % (ref 0–0.9)
LDLC SERPL CALC-MCNC: 100 MG/DL
LYMPHOCYTES # BLD AUTO: 1.9 X10*3/UL (ref 1.2–4.8)
LYMPHOCYTES NFR BLD AUTO: 31.2 %
MCH RBC QN AUTO: 29.7 PG (ref 26–34)
MCHC RBC AUTO-ENTMCNC: 33.4 G/DL (ref 32–36)
MCV RBC AUTO: 89 FL (ref 80–100)
MONOCYTES # BLD AUTO: 0.37 X10*3/UL (ref 0.1–1)
MONOCYTES NFR BLD AUTO: 6.1 %
NEUTROPHILS # BLD AUTO: 3.56 X10*3/UL (ref 1.2–7.7)
NEUTROPHILS NFR BLD AUTO: 58.4 %
NON HDL CHOLESTEROL: 132 MG/DL (ref 0–149)
NRBC BLD-RTO: 0 /100 WBCS (ref 0–0)
PLATELET # BLD AUTO: 215 X10*3/UL (ref 150–450)
POTASSIUM SERPL-SCNC: 4.2 MMOL/L (ref 3.5–5.3)
PROT SERPL-MCNC: 7.4 G/DL (ref 6.4–8.2)
RBC # BLD AUTO: 5.76 X10*6/UL (ref 4.5–5.9)
SODIUM SERPL-SCNC: 138 MMOL/L (ref 136–145)
TRIGL SERPL-MCNC: 159 MG/DL (ref 0–149)
VLDL: 32 MG/DL (ref 0–40)
WBC # BLD AUTO: 6.1 X10*3/UL (ref 4.4–11.3)

## 2024-03-20 PROCEDURE — 91322 SARSCOV2 VAC 50 MCG/0.5ML IM: CPT | Performed by: FAMILY MEDICINE

## 2024-03-20 PROCEDURE — 82390 ASSAY OF CERULOPLASMIN: CPT

## 2024-03-20 PROCEDURE — 85025 COMPLETE CBC W/AUTO DIFF WBC: CPT

## 2024-03-20 PROCEDURE — 80061 LIPID PANEL: CPT

## 2024-03-20 PROCEDURE — 1036F TOBACCO NON-USER: CPT | Performed by: FAMILY MEDICINE

## 2024-03-20 PROCEDURE — 93000 ELECTROCARDIOGRAM COMPLETE: CPT | Performed by: FAMILY MEDICINE

## 2024-03-20 PROCEDURE — 82977 ASSAY OF GGT: CPT

## 2024-03-20 PROCEDURE — 80053 COMPREHEN METABOLIC PANEL: CPT

## 2024-03-20 PROCEDURE — 3008F BODY MASS INDEX DOCD: CPT | Performed by: FAMILY MEDICINE

## 2024-03-20 PROCEDURE — 90471 IMMUNIZATION ADMIN: CPT | Performed by: FAMILY MEDICINE

## 2024-03-20 PROCEDURE — 83036 HEMOGLOBIN GLYCOSYLATED A1C: CPT

## 2024-03-20 PROCEDURE — 36415 COLL VENOUS BLD VENIPUNCTURE: CPT

## 2024-03-20 PROCEDURE — 80074 ACUTE HEPATITIS PANEL: CPT

## 2024-03-20 PROCEDURE — 82728 ASSAY OF FERRITIN: CPT

## 2024-03-20 PROCEDURE — 99396 PREV VISIT EST AGE 40-64: CPT | Performed by: FAMILY MEDICINE

## 2024-03-20 PROCEDURE — 90480 ADMN SARSCOV2 VAC 1/ONLY CMP: CPT | Performed by: FAMILY MEDICINE

## 2024-03-20 PROCEDURE — 90682 RIV4 VACC RECOMBINANT DNA IM: CPT | Performed by: FAMILY MEDICINE

## 2024-03-20 RX ORDER — NITROGLYCERIN 0.4 MG/1
0.4 TABLET SUBLINGUAL EVERY 5 MIN PRN
Qty: 25 TABLET | Refills: 3 | Status: SHIPPED | OUTPATIENT
Start: 2024-03-20 | End: 2025-03-20

## 2024-03-20 RX ORDER — SILDENAFIL 100 MG/1
TABLET, FILM COATED ORAL
Qty: 10 TABLET | Refills: 3 | Status: SHIPPED | OUTPATIENT
Start: 2024-03-20

## 2024-03-20 RX ORDER — OMEPRAZOLE 40 MG/1
40 CAPSULE, DELAYED RELEASE ORAL DAILY
Qty: 90 CAPSULE | Refills: 3 | Status: SHIPPED | OUTPATIENT
Start: 2024-03-20

## 2024-03-20 NOTE — PROGRESS NOTES
Gerard Miranda 54 y.o. male presents today for a physical -check up  And   Chief Complaint   Patient presents with    Chest Pain    Shoulder Pain     right    3/3 5k  ONCE when training after 1st mile mid chest twinge  Had trained and NO sxs  Another time twinge again in race 190 HR  Stopped and walked     Covid vax: UTD  Flu: UTD  Tdap: UTD  Shingles: advised     CRC: cologuard due 12/2025   had not been seen and insurance denied stress test    Right shoulder MILD ac djd in 2021  Offered PT and ortho refer      Patient Active Problem List   Diagnosis    Hyperlipidemia    Anxiety    Lumbar radicular pain    Depression    Disease due to severe acute respiratory syndrome coronavirus 2 (SARS-CoV-2)    Dizziness and giddiness    Ecchymosis of right eye    Erectile dysfunction    Fracture of occipital bone (CMS/HCC)    Fracture of orbital floor, right side, initial encounter for closed fracture (CMS/HCC)    Hyperglycemia    Hypogonadism male    Hypotropia of right eye    Melanocytic nevus    Obesity, Class I, BMI 30-34.9    Other specified hypothyroidism    Posterior vitreous detachment of right eye    Seborrheic keratosis    Severe obesity (CMS/HCC)    Strain of shoulder    Wrinkling of facial skin    Chest pain, unspecified    Condition not found    Coronary atherosclerosis    Disorder of thyroid    Radial styloid tenosynovitis    Pure hypercholesterolemia    Overweight    Hypotension    Family hx of ischem heart dis and oth dis of the circ sys      Past Surgical History:   Procedure Laterality Date    OTHER SURGICAL HISTORY  05/11/2021    Uvulopalatopharyngoplasty      No Known Allergies   Current Outpatient Medications   Medication Sig Dispense Refill    atorvastatin (Lipitor) 10 mg tablet Take 1 tablet (10 mg) by mouth once daily. 90 tablet 3    metFORMIN (Glucophage) 500 mg tablet Take 1 tablet (500 mg) by mouth once daily.      Synthroid 125 mcg tablet Take 1 tablet (125 mcg) by mouth once daily. 90 tablet 1     testosterone cypionate (Depo-Testosterone) 200 mg/mL injection Inject 0.5 mL (100 mg) into the muscle every 14 (fourteen) days. 2 mL 5    omeprazole (PriLOSEC) 40 mg DR capsule Take 1 capsule (40 mg) by mouth once daily. 90 capsule 3    sildenafil (Viagra) 100 mg tablet TAKE 1 TABLET AS NEEDED FOR ERECTILE DYSFUNCTION 10 tablet 3     No current facility-administered medications for this visit.      Social History     Tobacco Use    Smoking status: Never    Smokeless tobacco: Never   Substance Use Topics    Alcohol use: Not on file      Tgs 337-on statin    The patient reports  No Fever/chills  +Chest pain RECENT yahir w running no shortness of breath  No Nausea/vomiting/diarrhea  No CVA tenderness  No Confusion   No Paresthesias nor headache  No Urinary issues-frequency or dysuria nor gross hematuria  No new issues with anxiety nor depression, no suicidal nor homicidal ideations  Patient is aware of the controversy over PSA and USPSTF recommendations regarding screening options. He is aware of limitations of this test and will follow with urology if abnormality found.  He is also aware of current recommendation re: colon cancer screening starting at age 45 until 75.  If a smoker-he is aware of AAA screening guidelines AND CT lung screening recommendations and necessary follow up.     Objective:  Vitals:    03/20/24 1447   BP: 128/76   Pulse: 62   Resp: 16   Temp: 36.3 °C (97.4 °F)   SpO2: 97%     General:  alert, oriented, no acute distress.  No obvious skin rashes noted.   No gait disturbance noted.    Mood is pleasant, not tearful, no signs of emotional distress.  Not appearing intoxicated or altered.   No voiced delusions,   Normal, appropriate behavior.    HEENT: Normocephalic, atraumatic,   Pupils round, reactive to light  Extraocular motions intact and wnl  Tympanic membranes normal    Neck: no nuchal rigidity  No masses palpable.  No carotid bruits.  No thyromegaly.    Respiratory: Equal breath sounds  No  wheezes,    rales,    nor rhonchi  No respiratory distress.    Heart: Regular rate and rhythm, no    murmurs  no rubs/gallops    Abdomen: no masses palpable, no rebound nor guarding, no rebound nor guarding.    Extremities: NO cyanosis noted, no clubbing.   No edema noted.  2+dorsalis pedis pulses.    Normal-not antalgic, steady gait.      Assessment/Plan    Problem List Items Addressed This Visit       Erectile dysfunction    Relevant Medications    sildenafil (Viagra) 100 mg tablet    Other Relevant Orders    Comprehensive Metabolic Panel    CBC and Auto Differential    Lipid Panel    Hemoglobin A1C    Chest pain, unspecified - Primary    Relevant Medications    omeprazole (PriLOSEC) 40 mg DR capsule    Other Relevant Orders    ECG 12 lead (Clinic Performed)    Nuclear Stress Test    Comprehensive Metabolic Panel    CBC and Auto Differential    Lipid Panel    Hemoglobin A1C     Other Visit Diagnoses       Gastroesophageal reflux disease without esophagitis        Relevant Orders    Comprehensive Metabolic Panel    CBC and Auto Differential    Lipid Panel    Hemoglobin A1C    Need for vaccination        Relevant Orders    Moderna COVID-19 vaccine, [id number], monovalent, age 12 years and older, (50mcg/0.5mL)    Flu vaccine, quadrivalent, recombinant, preservative free, adult (FLUBLOK)    Comprehensive Metabolic Panel    CBC and Auto Differential    Lipid Panel    Hemoglobin A1C    Dyslipidemia        Relevant Orders    Nuclear Stress Test    Comprehensive Metabolic Panel    CBC and Auto Differential    Lipid Panel    Hemoglobin A1C    Physical exam        Relevant Orders    CT cardiac scoring wo IV contrast    Comprehensive Metabolic Panel    CBC and Auto Differential    Lipid Panel    Hemoglobin A1C        OVERDUE for recheck labs on statin    Labs soon  TO ER IF ANY CP yahir if sob  Stress-reordered      Patient to follow up at next regularly scheduled visit, health maintenance exams at least yearly-sooner if  condition deteriorates or problems arise.    The patient is aware that results of testing should be back in a timely manner-for labs it is usually a few days and no longer than a few weeks. If results are not received (by Mychart, mail, or phone call) patient is aware of the next steps and agrees to call office for results until obtained. If results are not understood by patient, they are aware they can make a virtual or in person visit to discuss.  NO viagra etc until cardiac issue resolved  No aggressive exercise  See me 4-8wks for results  Baby asa every day rba addressed  nitroglycerin rba addressed This medications risks, benefits, and alternatives were discussed with patient at length.  If any unwanted side effects occur-discontinue medicine and call the office for discussion.  No nitroglycerin w viagra    Jerica Sands MD

## 2024-03-21 ENCOUNTER — OFFICE VISIT (OUTPATIENT)
Dept: CARDIOLOGY | Facility: CLINIC | Age: 54
End: 2024-03-21
Payer: COMMERCIAL

## 2024-03-21 VITALS
BODY MASS INDEX: 33.21 KG/M2 | DIASTOLIC BLOOD PRESSURE: 78 MMHG | WEIGHT: 232 LBS | HEIGHT: 70 IN | HEART RATE: 64 BPM | SYSTOLIC BLOOD PRESSURE: 124 MMHG

## 2024-03-21 DIAGNOSIS — E78.5 DYSLIPIDEMIA: ICD-10-CM

## 2024-03-21 DIAGNOSIS — R07.89 OTHER CHEST PAIN: Primary | ICD-10-CM

## 2024-03-21 PROCEDURE — 3008F BODY MASS INDEX DOCD: CPT | Performed by: NURSE PRACTITIONER

## 2024-03-21 PROCEDURE — 1036F TOBACCO NON-USER: CPT | Performed by: NURSE PRACTITIONER

## 2024-03-21 PROCEDURE — 99205 OFFICE O/P NEW HI 60 MIN: CPT | Performed by: NURSE PRACTITIONER

## 2024-03-22 ENCOUNTER — HOSPITAL ENCOUNTER (OUTPATIENT)
Dept: RADIOLOGY | Facility: CLINIC | Age: 54
Discharge: HOME | End: 2024-03-22
Payer: COMMERCIAL

## 2024-03-22 DIAGNOSIS — R79.89 ELEVATED LIVER FUNCTION TESTS: Primary | ICD-10-CM

## 2024-03-22 DIAGNOSIS — Z00.00 PHYSICAL EXAM: ICD-10-CM

## 2024-03-22 LAB
CERULOPLASMIN SERPL-MCNC: 23.7 MG/DL (ref 20–60)
FERRITIN SERPL-MCNC: 276 NG/ML (ref 20–300)
GGT SERPL-CCNC: 49 U/L (ref 5–64)
HAV IGM SER QL: NONREACTIVE
HBV CORE IGM SER QL: NONREACTIVE
HBV SURFACE AG SERPL QL IA: NONREACTIVE
HCV AB SER QL: NONREACTIVE

## 2024-03-22 PROCEDURE — 75571 CT HRT W/O DYE W/CA TEST: CPT

## 2024-03-22 NOTE — PROGRESS NOTES
Gerard Miranda is a 54 y.o. male that presents to the office today for new patient evaluation referred by his primary DrShavon for chest pain. He has a PMH of hyperlipidemia and hypothyroidism. Denies tobacco, vaping, alcohol or recreational drug use.  He exercises daily.  Family history positive for father  MI 61 yr. Brother diabetes.     He states that he had been training for a 5 K and would experience periods of left chest pain described as a twinge that was some times associated with dizziness and shortness of breath that is relieved with rest.  He states at the day of the 5K at 2.5 miles his watch indicated his HR was elevated to 215/220 bpm, he states that he stopped running at that time with his heart rate eventually returning to normal. He denies chest pain with this elevated heart rate.  He was recently seen by his PCP who has ordered a CT coronary calcium score along with a nuclear stress test.        Testing Reviewed  3/20/2024 EKG:  SR HR 61 bpm QT/Qtc 420/423.    3/20/2024 Labs: Glucose 104, , K4.2, BUN 19, creatinine 1.44, , , HgbA1c 5.6, WBC 6.1, Hgb 17, HCT 51.2, platelets 215, cholesterol 191, HDL 58, , triglycerides 159.      Assessment/Plan  1.  Chest pain; pending nuclear stress test and CT coronary calcium score.  2.  Dizziness/shortness of breath:  will obtain echocardiogram to assess for valve and pump function  3.  Hyperlipidemia:  Lipid panel as noted above.   4.   Elevated LFT's:  , .  Currently holding statin.  Per primary.   5.  Further recommendations pending testing results  6. Follow in office with Dr. Cueva after testing per Gerard request.         Patient Active Problem List   Diagnosis    Hyperlipidemia    Anxiety    Lumbar radicular pain    Depression    Disease due to severe acute respiratory syndrome coronavirus 2 (SARS-CoV-2)    Dizziness and giddiness    Ecchymosis of right eye    Erectile dysfunction    Fracture of occipital  bone (CMS/HCC)    Fracture of orbital floor, right side, initial encounter for closed fracture (CMS/HCC)    Hyperglycemia    Hypogonadism male    Hypotropia of right eye    Melanocytic nevus    Obesity, Class I, BMI 30-34.9    Other specified hypothyroidism    Posterior vitreous detachment of right eye    Seborrheic keratosis    Severe obesity (CMS/HCC)    Strain of shoulder    Wrinkling of facial skin    Chest pain, unspecified    Condition not found    Coronary atherosclerosis    Disorder of thyroid    Radial styloid tenosynovitis    Pure hypercholesterolemia    Overweight    Hypotension    Family hx of ischem heart dis and oth dis of the [facility] [place]       Social History     Tobacco Use    Smoking status: Never    Smokeless tobacco: Never       Past Medical History:   Diagnosis Date    COVID-19     COVID-19    Unspecified fracture of occiput, initial encounter for closed fracture (CMS/HCC)     Occipital fracture         Current Outpatient Medications:     metFORMIN (Glucophage) 500 mg tablet, Take 1 tablet (500 mg) by mouth once daily., Disp: , Rfl:     nitroglycerin (Nitrostat) 0.4 mg SL tablet, Place 1 tablet (0.4 mg) under the tongue every 5 minutes if needed for chest pain. May repeat every 5 minutes for up to 3 doses., Disp: 25 tablet, Rfl: 3    omeprazole (PriLOSEC) 40 mg DR capsule, Take 1 capsule (40 mg) by mouth once daily., Disp: 90 capsule, Rfl: 3    sildenafil (Viagra) 100 mg tablet, TAKE 1 TABLET AS NEEDED FOR ERECTILE DYSFUNCTION, Disp: 10 tablet, Rfl: 3    Synthroid 125 mcg tablet, Take 1 tablet (125 mcg) by mouth once daily., Disp: 90 tablet, Rfl: 1    testosterone cypionate (Depo-Testosterone) 200 mg/mL injection, Inject 0.5 mL (100 mg) into the muscle every 14 (fourteen) days., Disp: 2 mL, Rfl: 5    atorvastatin (Lipitor) 10 mg tablet, Take 1 tablet (10 mg) by mouth once daily. (Patient not taking: Reported on 3/21/2024), Disp: 90 tablet, Rfl: 3    Patient has no known allergies.    Family History  "  Problem Relation Name Age of Onset    Coronary artery disease Other         Past Surgical History:   Procedure Laterality Date    OTHER SURGICAL HISTORY  05/11/2021    Uvulopalatopharyngoplasty          Review of systems  Constitutional: No weight loss, fever, chills, weakness or fatigue  HEENT: No visual loss, blurred vision, double vision or yellow sclerae  Skin: No rash or itching  Cardiovascular: Positive for chest pain/discomfort No palpitations or edema.  Respiratory: Positive for shortness of breath wit chest pain, denies cough or sputum  Gastrointestinal: No nausea, vomiting or diarrhea. No bloody or dark tarry stools.  Neurological: Positive for lightheadedness with chest pain. No headache, syncope. No numbness or tingling in the extremities. No change in mood, affect, memory, metation.   Musculoskeletal: No muscle, back pain, joint pain or stiffness.  Hematologic: No anemia, bleeding or bruising.    /78 (BP Location: Left arm)   Pulse 64   Ht 1.778 m (5' 10\")   Wt 105 kg (232 lb)   BMI 33.29 kg/m²     Patient Active Problem List   Diagnosis    Hyperlipidemia    Anxiety    Lumbar radicular pain    Depression    Disease due to severe acute respiratory syndrome coronavirus 2 (SARS-CoV-2)    Dizziness and giddiness    Ecchymosis of right eye    Erectile dysfunction    Fracture of occipital bone (CMS/HCC)    Fracture of orbital floor, right side, initial encounter for closed fracture (CMS/HCC)    Hyperglycemia    Hypogonadism male    Hypotropia of right eye    Melanocytic nevus    Obesity, Class I, BMI 30-34.9    Other specified hypothyroidism    Posterior vitreous detachment of right eye    Seborrheic keratosis    Severe obesity (CMS/HCC)    Strain of shoulder    Wrinkling of facial skin    Chest pain, unspecified    Condition not found    Coronary atherosclerosis    Disorder of thyroid    Radial styloid tenosynovitis    Pure hypercholesterolemia    Overweight    Hypotension    Family hx of ischem " heart dis and oth dis of the circ sys         Physical Exam  Constitutional: Well developed, awake/alert x 3, no distress.  Head/Neck: No JVD, No bruits  Respiratory/Thorax: patent airways, CTAB, normal breath sounds with good expansion.  Cardiovascular: Regular rate and rhythm, no murmurs, normal S1 and S2,   Gastrointestinal: Non distended, soft, non-tender, no rebound tenderness or guarding.  Extremities: No cyanosis, edema.    Neurological: Alert and oriented x 3. Moves extremities spontaneous with purpose.  Psychological: Appropriate mood and behavior  Skin: Warm and Dry. No lesions or rashes.         Please excuse any errors in grammar or translation related to dictation, voice recognition software was used to prepare this document.

## 2024-03-26 ENCOUNTER — APPOINTMENT (OUTPATIENT)
Dept: RADIOLOGY | Facility: HOSPITAL | Age: 54
End: 2024-03-26
Payer: COMMERCIAL

## 2024-03-26 ENCOUNTER — APPOINTMENT (OUTPATIENT)
Dept: CARDIOLOGY | Facility: HOSPITAL | Age: 54
End: 2024-03-26
Payer: COMMERCIAL

## 2024-03-26 ENCOUNTER — HOSPITAL ENCOUNTER (OUTPATIENT)
Dept: PHYSICAL THERAPY | Age: 54
Setting detail: THERAPIES SERIES
Discharge: HOME | End: 2024-03-26
Payer: COMMERCIAL

## 2024-03-26 PROCEDURE — 97162 PT EVAL MOD COMPLEX 30 MIN: CPT

## 2024-03-26 PROCEDURE — 97112 NEUROMUSCULAR REEDUCATION: CPT

## 2024-03-26 PROCEDURE — 97140 MANUAL THERAPY 1/> REGIONS: CPT

## 2024-03-26 ASSESSMENT — PAIN SCALES - GENERAL: PAINLEVEL_OUTOF10: 1

## 2024-03-26 ASSESSMENT — PAIN DESCRIPTION - ORIENTATION: ORIENTATION: RIGHT

## 2024-03-26 ASSESSMENT — PAIN DESCRIPTION - LOCATION: LOCATION: SHOULDER

## 2024-03-26 ASSESSMENT — PAIN DESCRIPTION - DESCRIPTORS: DESCRIPTORS: SHARP

## 2024-03-26 NOTE — PROGRESS NOTES
Tolerance to age appropriate activities  Modalities:Modalities:  (estim, cp, US*)        Manual:  Manual Therapy  Joint Mobilization: GH distraction 1 min with decreased pain,  (with mob inf/post gr III/IV**)  Soft Tissue Mobilizaton: test supscap tightness and perform release if need*  Other: DN/Education ( 4 mins) to normalize inflammation, dec pain and provide improved biomechanics ( see DN diagram for sites needled that will be scanned into EMR upon D/C)  Treatment Reasoning  Limitations addressed: Joint motion, Tissue extensibility, Painful spasm  Functional ability(s) targeted: Tolerance to age appropriate activities  *Indicates exercise,modality, or manual techniques to be initiated when appropriate       ASSESSMENT     Impression: Assessment: Pt is a 55 yo male well developed and active with complaints of anteromedial shoulder pain with arm elevation. No speicific injury reported however repetitive lifting may be contributing factor.  Pt sensitive to AC compression, limited IR and global RC and bicep/tricep weakness B. The pt's impairments currently limit functional abilities by 46% including her abilities to reach and lift, perform recreational activities, and perform household/work related duties without pain or limitations. Skilled PT required to address above deficits to improve overall function and return to prior level of function    Body Structures, Functions, Activity Limitations Requiring Skilled Therapeutic Intervention: Decreased posture, Increased pain, Decreased ROM, Decreased ADL status, Decreased strength, Decreased tolerance to work activity    Statement of Medical Necessity: Physical Therapy is both indicated and medically necessary as outlined in the POC to increase the likelihood of meeting the functionally related goals stated below.     Patient's Activity Tolerance: Patient tolerated evaluation without incident      Patient's rehabilitation potential/prognosis is considered to be:

## 2024-03-26 NOTE — PLAN OF CARE
PHYSICAL THERAPY PLAN OF CARE    [address]   [place] [address]      Phone:[phone]    [] Certification  [] Recertification [x]  Plan of Care  [] Progress Note [] Discharge      Referring Provider: Jeaneth Montesinos MD     From:  Marcia Arora, PT    Patient: Pranav Foster (54 y.o. male) : 1970 Date: 2024   Medical Diagnosis: Pain in right shoulder [M25.511]  Strain of muscle(s) and tendon(s) of the rotator cuff of right shoulder, initial encounter [S46.011A]      Treatment Diagnosis: R Shoulder pain with mechanical ROM limitations. Decreased strength affecting overall R shoulder functional tolerance.         Progress Report Period from:  3/26/2024  to 3/26/2024    Visits to Date: 1 No Show: 0 Cancelled Appts: 0    OBJECTIVE:   Short Term Goals - Time Frame for Short Term Goals: 2 weeks    Goals Current/Discharge status  Status   Short Term Goal 1: Decrease R shoulder pain 50% to assist with improved functional gains.  Pain Screening  Patient Currently in Pain: Yes  Pain Assessment: 0-10  Pain Level: 1  Best Pain Level: 1  Worst Pain Level: 8  Pain Location: Shoulder  Pain Orientation: Right  Pain Descriptors: Sharp STG Goal 1 Status:: New   Long Term Goals - Time Frame for Long Term Goals : 4-6 weeks  Goals Current/ Discharge status Status   Long Term Goal 1: Indep HEP for symptom management Needs Written HEP initiated  for symptom management  Needs progression for comprehensive program development. LTG Goal 1 Status:: New   Long Term Goal 2: Pt demo improved overall function by reporting greater than 100% per functional survey score Exam: UEFS 77/80= 96% functional limits with working out, lifting overhead, dressing, throwing a ball or carrying small loads.   LTG Goal 2 Status:: New   Long Term Goal 3: Pain-free R Shoulder Flex 160, ER 65, IR reach L1 AROM to WNL allowing an increase in ADL tolerance.    AROM RUE (degrees)  R Shoulder Flexion (0-180): 140  R Shoulder

## 2024-03-28 ENCOUNTER — HOSPITAL ENCOUNTER (OUTPATIENT)
Dept: PHYSICAL THERAPY | Age: 54
Setting detail: THERAPIES SERIES
End: 2024-03-28
Payer: COMMERCIAL

## 2024-03-29 ENCOUNTER — HOSPITAL ENCOUNTER (OUTPATIENT)
Dept: PHYSICAL THERAPY | Age: 54
Setting detail: THERAPIES SERIES
Discharge: HOME | End: 2024-03-29
Payer: COMMERCIAL

## 2024-03-29 PROCEDURE — 97110 THERAPEUTIC EXERCISES: CPT

## 2024-03-29 PROCEDURE — 97035 APP MDLTY 1+ULTRASOUND EA 15: CPT

## 2024-03-29 ASSESSMENT — PAIN DESCRIPTION - ORIENTATION: ORIENTATION: RIGHT

## 2024-03-29 ASSESSMENT — PAIN SCALES - GENERAL: PAINLEVEL_OUTOF10: 1

## 2024-03-29 ASSESSMENT — PAIN DESCRIPTION - DESCRIPTORS: DESCRIPTORS: ACHING

## 2024-03-29 ASSESSMENT — PAIN DESCRIPTION - LOCATION: LOCATION: SHOULDER

## 2024-03-29 NOTE — PROGRESS NOTES
[address]   [place] [id number]  Phone:[phone]      Physical TherapyTreatment Note        Date: 3/29/2024  Patient: Pranav Foster  : 1970   Confirmed: Yes  MRN: [id number]  Referring Provider: Jeaneth Montesinos MD      Medical Diagnosis: Pain in right shoulder [M25.511]  Strain of muscle(s) and tendon(s) of the rotator cuff of right shoulder, initial encounter [S46.011A]      Treatment Diagnosis: R Shoulder pain with mechanical ROM limitations. Decreased strength affecting overall R shoulder functional tolerance.    Visit Information:  Insurance: Payor: [employer] / Plan: [employer] [address] / Product Type: *No Product type* /   PT Visit Information  Onset Date: 23  PT Insurance Information: Medical mutual  Total # of Visits Approved:  (BMN)  Total # of Visits to Date: 2  No Show: 0  Canceled Appointment: 0  Progress Note Counter:     Subjective Information:     HEP Compliance:  [x] Pt given written instuction for HEP this date.     Pain Screening  Patient Currently in Pain: Yes  Pain Assessment: 0-10  Pain Level: 1  Pain Location: Shoulder  Pain Orientation: Right  Pain Descriptors: Aching    Treatment:  Exercises:  Exercises  Exercise 1: IR stretch 10s x 5  Exercise 2: B ER with band RTB x 15  Exercise 3: lat pull with shoulder less than 90deg to start RTB x 15  Exercise 4: SL ER and abd to <90 deg 3 x 5  Exercise 5: prone scap over ball*,  Exercise 6: horizontal abd (<90 deg elevation) RTB x 15  Exercise 7: tricep 4# in neutral position and bicep with palm up and neutral position x 15  Treatment Reasoning  Limitations addressed: Strength, Mobility, Pain modulation, Posture  Functional ability(s) targeted: Reaching overhead, Tolerance to age appropriate activities    Manual:   Manual Therapy  Joint Mobilization: GH distraction 1 min with decreased pain,  (with mob inf/post gr III/IV**)  Soft Tissue Mobilizaton: test supscap tightness and perform

## 2024-04-02 ENCOUNTER — HOSPITAL ENCOUNTER (OUTPATIENT)
Dept: PHYSICAL THERAPY | Age: 54
Setting detail: THERAPIES SERIES
Discharge: HOME | End: 2024-04-02
Payer: COMMERCIAL

## 2024-04-02 PROCEDURE — 97110 THERAPEUTIC EXERCISES: CPT

## 2024-04-02 PROCEDURE — 97140 MANUAL THERAPY 1/> REGIONS: CPT

## 2024-04-02 NOTE — PROGRESS NOTES
tolerance.  Therapy Prognosis: Good          Post-Pain Assessment:       Pain Rating (0-10 pain scale):   0/10   Location and pain description same as pre-treatment unless indicated.   Action: [x] NA   [] Perform HEP  [] Meds as prescribed  [] Modalities as prescribed   [] Call Physician     GOALS   Patient Goal(s): Patient Goals : Decrease pain and improve function R shoulder    Short Term Goals Completed by 2 weeks Goal Status   STG 1 Decrease R shoulder pain 50% to assist with improved functional gains. Partially met     Long Term Goals Completed by 4-6 weeks Goal Status   LTG 1 Indep HEP for symptom management In progress   LTG 2 Pt demo improved overall function by reporting greater than 100% per functional survey score In progress   LTG 3 Pain-free R Shoulder Flex 160, ER 65, IR reach L1 AROM to WNL allowing an increase in ADL tolerance. In progress   LTG 4 Improve R shoulder strength 4+/5 to allow patient to lift/carry overhead and throw a ball. In progress          Plan:  Frequency/Duration:  Plan  Plan Frequency: 2  Plan weeks: 6  Current Treatment Recommendations: Strengthening, ROM, Neuromuscular re-education, Home exercise program, Group Therapy, Manual, Modalities, Dry needling  Modalities: Heat/Cold, Ultrasound, E-stim - unattended  Pt to continue current HEP.  See objective section for any therapeutic exercise changes, additions or modifications this date.    Therapy Time:      PT Individual Minutes  Time In: 1300  Time Out: 1345  Minutes: 45  Timed Code Treatment Minutes: 45 Minutes  Procedure Minutes:NA  Timed Activity Minutes Units   Ther Ex 26 2   Manual  13 1   US 6 0     Electronically signed by Marcia Arora, PT on 4/2/24 at 2:19 PM EDT

## 2024-04-05 ENCOUNTER — HOSPITAL ENCOUNTER (OUTPATIENT)
Dept: PHYSICAL THERAPY | Age: 54
Setting detail: THERAPIES SERIES
Discharge: HOME | End: 2024-04-05
Payer: COMMERCIAL

## 2024-04-05 PROCEDURE — 97110 THERAPEUTIC EXERCISES: CPT

## 2024-04-05 PROCEDURE — G0283 ELEC STIM OTHER THAN WOUND: HCPCS

## 2024-04-05 PROCEDURE — 97140 MANUAL THERAPY 1/> REGIONS: CPT

## 2024-04-05 NOTE — PROGRESS NOTES
[address]   [place] [address]  Phone:[phone]      Physical TherapyTreatment Note        Date: 2024  Patient: Pranav Foster  : 1970   Confirmed: Yes  MRN: [id number]  Referring Provider: Jeaneth Montesinos MD      Medical Diagnosis: Pain in right shoulder [M25.511]  Strain of muscle(s) and tendon(s) of the rotator cuff of right shoulder, initial encounter [S46.011A]      Treatment Diagnosis: R Shoulder pain with mechanical ROM limitations. Decreased strength affecting overall R shoulder functional tolerance.    Visit Information:  Insurance: Payor: [employer] / Plan: [employer] [address] / Product Type: *No Product type* /   PT Visit Information  Onset Date: 23  PT Insurance Information: Medical mutual  Total # of Visits Approved:  (BMN)  Total # of Visits to Date: 4  No Show: 0  Canceled Appointment: 0  Progress Note Counter:     Subjective Information:  Subjective: Pt reports overall doing well. Had some discomfort with rear delt work out and abduction and pt advised on avoiding this motion to assist healing.  HEP Compliance:  [x] Good [] Fair [] Poor [] Reports not doing due to:    Pain Screening  Patient Currently in Pain: Denies    Treatment:  Exercises:  Exercises  Exercise 1: IR stretch 10s x 5  Exercise 2: B ER with band RTB x 15  Exercise 3: lat pull/midrow with shoulder less than 90deg to start RTB x 30  Exercise 4: SL ER and abd to <90 deg 3 x 5  Exercise 5: prone scap over ball ext 4# x 15, midrow with 4# x 15  Exercise 6: horizontal abd (<90 deg elevation) RTB x 15  Exercise 7: tricep 4# in neutral position and bicep with palm up and neutral position x 15  Treatment Reasoning  Limitations addressed: Strength, Mobility, Pain modulation, Posture  Functional ability(s) targeted: Reaching overhead, Tolerance to age appropriate activities    Manual:   Manual Therapy  Joint Mobilization: GH distraction 5  min with decreased pain, with mob

## 2024-04-08 ENCOUNTER — APPOINTMENT (OUTPATIENT)
Dept: CARDIOLOGY | Facility: CLINIC | Age: 54
End: 2024-04-08
Payer: COMMERCIAL

## 2024-04-08 ENCOUNTER — APPOINTMENT (OUTPATIENT)
Dept: RADIOLOGY | Facility: CLINIC | Age: 54
End: 2024-04-08
Payer: COMMERCIAL

## 2024-04-09 ENCOUNTER — HOSPITAL ENCOUNTER (OUTPATIENT)
Dept: PHYSICAL THERAPY | Age: 54
Setting detail: THERAPIES SERIES
Discharge: HOME | End: 2024-04-09
Payer: COMMERCIAL

## 2024-04-09 NOTE — PROGRESS NOTES
Therapy                            Cancellation/No-show Note    Date: 2024  Patient: Pranav Foster (54 y.o. male)  : 1970  MRN:  [id number]  Referring Physician: Jeaneth Montesinos MD    Medical Diagnosis: Pain in right shoulder [M25.511]  Strain of muscle(s) and tendon(s) of the rotator cuff of right shoulder, initial encounter [S46.011A]      Visit Information:  Insurance: Payor: [employer] / Plan: [employer] [address] / Product Type: *No Product type* /   Visits to Date: 4   No Show/Cancelled Appts: 0 /       For today's appointment patient:  [x]  Cancelled  []  Rescheduled appointment  []  No-show   []  Called pt to remind of next appointment     Reason given by patient:  []  Patient ill  []  Conflicting appointment  []  No transportation    []  Conflict with work  [x]  No reason given  []  Other:      [x] Pt has future appointments scheduled, no follow up needed  [] Pt requests to be on hold.    Reason:   If > 2 weeks please discuss with therapist.  [] Therapist to call pt for follow up     Comments:       Signature: Electronically signed by Marcia Arora PT on 24 at 3:17 PM EDT

## 2024-04-12 ENCOUNTER — APPOINTMENT (OUTPATIENT)
Dept: CARDIOLOGY | Facility: CLINIC | Age: 54
End: 2024-04-12
Payer: COMMERCIAL

## 2024-04-12 ENCOUNTER — HOSPITAL ENCOUNTER (OUTPATIENT)
Dept: PHYSICAL THERAPY | Age: 54
Setting detail: THERAPIES SERIES
Discharge: HOME | End: 2024-04-12
Payer: COMMERCIAL

## 2024-04-12 PROCEDURE — 97140 MANUAL THERAPY 1/> REGIONS: CPT

## 2024-04-12 PROCEDURE — 97110 THERAPEUTIC EXERCISES: CPT

## 2024-04-12 PROCEDURE — G0283 ELEC STIM OTHER THAN WOUND: HCPCS

## 2024-04-12 ASSESSMENT — PAIN DESCRIPTION - DESCRIPTORS: DESCRIPTORS: ACHING

## 2024-04-12 ASSESSMENT — PAIN DESCRIPTION - LOCATION: LOCATION: SHOULDER

## 2024-04-12 ASSESSMENT — PAIN DESCRIPTION - ORIENTATION: ORIENTATION: RIGHT

## 2024-04-12 ASSESSMENT — PAIN SCALES - GENERAL: PAINLEVEL_OUTOF10: 4

## 2024-04-12 NOTE — PROGRESS NOTES
Decreased strength affecting overall R shoulder functional tolerance.  Therapy Prognosis: Good          Post-Pain Assessment:       Pain Rating (0-10 pain scale):  0 /10   Location and pain description same as pre-treatment unless indicated.   Action: [x] NA   [] Perform HEP  [] Meds as prescribed  [] Modalities as prescribed   [] Call Physician     GOALS   Patient Goal(s): Patient Goals : Decrease pain and improve function R shoulder    Short Term Goals Completed by 2 weeks Goal Status   STG 1 Decrease R shoulder pain 50% to assist with improved functional gains. Partially met     Long Term Goals Completed by 4-6 weeks Goal Status   LTG 1 Indep HEP for symptom management In progress   LTG 2 Pt demo improved overall function by reporting greater than 100% per functional survey score In progress   LTG 3 Pain-free R Shoulder Flex 160, ER 65, IR reach L1 AROM to WNL allowing an increase in ADL tolerance. In progress   LTG 4 Improve R shoulder strength 4+/5 to allow patient to lift/carry overhead and throw a ball. In progress          Plan:  Frequency/Duration:  Plan  Plan Frequency: 2  Plan weeks: 6  Current Treatment Recommendations: Strengthening, ROM, Neuromuscular re-education, Home exercise program, Group Therapy, Manual, Modalities, Dry needling  Modalities: Heat/Cold, Ultrasound, E-stim - unattended  Pt to continue current HEP.  See objective section for any therapeutic exercise changes, additions or modifications this date.    Therapy Time:      PT Individual Minutes  Time In: 1410  Time Out: 1455  Minutes: 45  Timed Code Treatment Minutes: 28 Minutes  Procedure Minutes:10 min Estim/CP  Timed Activity Minutes Units   Ther Ex 14 1   US 6 0   Manual  8 1     Electronically signed by Marcia Arora PT on 4/12/24 at 4:15 PM EDT

## 2024-04-23 ENCOUNTER — APPOINTMENT (OUTPATIENT)
Dept: CARDIOLOGY | Facility: CLINIC | Age: 54
End: 2024-04-23
Payer: COMMERCIAL

## 2024-05-08 ENCOUNTER — HOSPITAL ENCOUNTER (OUTPATIENT)
Dept: RADIOLOGY | Facility: HOSPITAL | Age: 54
Discharge: HOME | End: 2024-05-08
Payer: COMMERCIAL

## 2024-05-08 ENCOUNTER — OFFICE VISIT (OUTPATIENT)
Dept: ORTHOPEDIC SURGERY | Facility: CLINIC | Age: 54
End: 2024-05-08
Payer: COMMERCIAL

## 2024-05-08 DIAGNOSIS — M25.511 ACUTE PAIN OF RIGHT SHOULDER: ICD-10-CM

## 2024-05-08 DIAGNOSIS — M75.100 TEAR OF ROTATOR CUFF, UNSPECIFIED LATERALITY, UNSPECIFIED TEAR EXTENT, UNSPECIFIED WHETHER TRAUMATIC: ICD-10-CM

## 2024-05-08 PROCEDURE — 3008F BODY MASS INDEX DOCD: CPT | Performed by: ORTHOPAEDIC SURGERY

## 2024-05-08 PROCEDURE — 99204 OFFICE O/P NEW MOD 45 MIN: CPT | Performed by: ORTHOPAEDIC SURGERY

## 2024-05-08 PROCEDURE — 73030 X-RAY EXAM OF SHOULDER: CPT | Mod: RIGHT SIDE | Performed by: RADIOLOGY

## 2024-05-08 PROCEDURE — 73030 X-RAY EXAM OF SHOULDER: CPT | Mod: RT

## 2024-05-08 NOTE — PROGRESS NOTES
History of present: 6-month history of shoulder pain following an acute injury while lifting weights and trying shoulder routine her shoulder anterior labral bicep complex at the interval    He had 6 months of rest activity modification PT therapy stretching range of motion program worked himself back into working with bands and therapy and stretching    He is tried high-dose anti-inflammatories for over 4 months    He is now here for evaluation with continuous pain night pain and weakness down to persisting in the arm        Past medical history:    The patient's past medical history, family history, social history and review of systems were documented on the patient's medical intake form.  The medical intake form was reviewed and scanned into the electronic medical record for future use.  History is otherwise negative except as stated in the history of present illness.    Physical exam:    General: Alert and oriented to person place and time.  No acute distress and breathing comfortably, pleasant and cooperative with examination.    Head and neck exam: Head is normocephalic atraumatic.    Neck: Supple no visible swelling or deformity.    Cardiovascular: Good perfusion to affected extremities without signs or symptoms of chest pain.    Lungs no audible wheezing or labored breathing on examination.    Abdominal exam: Nondistended nontender    Extremities: The right shoulder was inspected and was found to have no obvious deformity.  There was tenderness to touch over the lateral edges of the shoulder over the rotator cuff insertion.  Active forward flexion 120 degrees, external rotation to 60 degrees, abduction to 50 degrees, and internal rotation to the level of L2.    At this time the shoulder is neurovascular tact and neurosensory intact.  Motor intact C5-T1.  There was no obvious neck pain or radiculopathy noted.  There was no gross instability or adhesive capsulitis symptoms.  There was no evidence of  apprehension or apprehension suppression.    Strength was tested in 4 planes with weakness in the supraspinatus strength testing and external rotation position.  There was no strength deficit in internal rotation.  Impingement signs were positive both supine and standing for impingement test type I and II.  There was mild pain over the bicipital groove with a positive speeds sign          Diagnostic studies: X-rays show well-positioned glenohumeral joint AC joint mild to moderate arthritis typical weightlifter shoulder      Impression: Right shoulder rotator cuff tear or interval tear including bicep phase as well with considerable pain despite 6 months of activity modification and rest therapy exercise program and high-dose anti-inflammatories patient had a traumatic injury while lifting weights in the fall 2023      Plan: MRI now for surgical planning is mandatory most likely bicep labral rotator cuff tear we will see him back after MRI studies of the right shoulder    His wife works at [facility] will get the scan which she is is the therapy director see her back after MRI scan  Injection offered, pt declined  L Inj/Asp on 5/9/2024 12:26 PM

## 2024-05-09 ENCOUNTER — PATIENT MESSAGE (OUTPATIENT)
Dept: PRIMARY CARE | Facility: CLINIC | Age: 54
End: 2024-05-09

## 2024-05-09 DIAGNOSIS — E07.9 DISORDER OF THYROID: ICD-10-CM

## 2024-05-09 DIAGNOSIS — R79.89 ELEVATED LFTS: ICD-10-CM

## 2024-05-09 RX ORDER — BETAMETHASONE SODIUM PHOSPHATE AND BETAMETHASONE ACETATE 3; 3 MG/ML; MG/ML
2 INJECTION, SUSPENSION INTRA-ARTICULAR; INTRALESIONAL; INTRAMUSCULAR; SOFT TISSUE
Status: DISCONTINUED | OUTPATIENT
Start: 2024-05-09 | End: 2024-05-13

## 2024-05-09 RX ORDER — LIDOCAINE HYDROCHLORIDE 10 MG/ML
5 INJECTION INFILTRATION; PERINEURAL
Status: DISCONTINUED | OUTPATIENT
Start: 2024-05-09 | End: 2024-05-13

## 2024-05-10 RX ORDER — LEVOTHYROXINE SODIUM 125 UG/1
125 TABLET ORAL DAILY
Qty: 90 TABLET | Refills: 1 | Status: SHIPPED | OUTPATIENT
Start: 2024-05-10

## 2024-05-10 NOTE — TELEPHONE ENCOUNTER
From: Gerard Miranda  To: Jerica Sands  Sent: 5/9/2024 1:53 PM EDT  Subject: Synthroid    I need Synthroid refilled and orders for liver enzymes. Enzymes are a follow up to previous conversation with Jerica.

## 2024-05-13 ENCOUNTER — OFFICE VISIT (OUTPATIENT)
Dept: ORTHOPEDIC SURGERY | Facility: CLINIC | Age: 54
End: 2024-05-13
Payer: COMMERCIAL

## 2024-05-13 DIAGNOSIS — M75.100 TEAR OF ROTATOR CUFF, UNSPECIFIED LATERALITY, UNSPECIFIED TEAR EXTENT, UNSPECIFIED WHETHER TRAUMATIC: Primary | ICD-10-CM

## 2024-05-13 PROCEDURE — 3008F BODY MASS INDEX DOCD: CPT | Performed by: ORTHOPAEDIC SURGERY

## 2024-05-13 PROCEDURE — 20610 DRAIN/INJ JOINT/BURSA W/O US: CPT | Performed by: ORTHOPAEDIC SURGERY

## 2024-05-13 PROCEDURE — 99213 OFFICE O/P EST LOW 20 MIN: CPT | Performed by: ORTHOPAEDIC SURGERY

## 2024-05-13 RX ORDER — LIDOCAINE HYDROCHLORIDE 10 MG/ML
5 INJECTION INFILTRATION; PERINEURAL
Status: COMPLETED | OUTPATIENT
Start: 2024-05-13 | End: 2024-05-13

## 2024-05-13 RX ORDER — BETAMETHASONE SODIUM PHOSPHATE AND BETAMETHASONE ACETATE 3; 3 MG/ML; MG/ML
2 INJECTION, SUSPENSION INTRA-ARTICULAR; INTRALESIONAL; INTRAMUSCULAR; SOFT TISSUE
Status: COMPLETED | OUTPATIENT
Start: 2024-05-13 | End: 2024-05-13

## 2024-05-13 RX ADMIN — BETAMETHASONE SODIUM PHOSPHATE AND BETAMETHASONE ACETATE 2 ML: 3; 3 INJECTION, SUSPENSION INTRA-ARTICULAR; INTRALESIONAL; INTRAMUSCULAR; SOFT TISSUE at 11:05

## 2024-05-13 RX ADMIN — LIDOCAINE HYDROCHLORIDE 5 ML: 10 INJECTION INFILTRATION; PERINEURAL at 11:05

## 2024-05-13 NOTE — PROGRESS NOTES
History of present illness: Right shoulder pain    Physical exam:    General: No acute distress or breathing difficulty or discomfort, pleasant and cooperative with the examination.    Extremities: The right shoulder was inspected and was found to have no obvious deformity.  There was tenderness to touch over the lateral edges of the shoulder over the rotator cuff insertion.  Active forward flexion 120 degrees, external rotation to 60 degrees, abduction to 50 degrees, and internal rotation to the level of L2.    At this time the shoulder is neurovascular tact and neurosensory intact.  Motor intact C5-T1.  There was no obvious neck pain or radiculopathy noted.  There was no gross instability or adhesive capsulitis symptoms.  There was no evidence of apprehension or apprehension suppression.    Strength was tested in 4 planes with weakness in the supraspinatus strength testing and external rotation position.  There was no strength deficit in internal rotation.  Impingement signs were positive both supine and standing for impingement test type I and II.  There was mild pain over the bicipital groove with a positive speeds sign    Before aspiration injection the benefits of a cortisone injection including infection, local skin irritation, skin atrophy, calcification, continued pain and discomfort, elevated blood sugar, burning, failure to relieve pain, possible late infection were discussed with the patient.    Postprocedure discomfort can be alleviated with additional medications, ice, elevation, rest over the first 24 hours as recommended.    Patient verbalized understanding and wanted to proceed with the planned procedure.    After informed consent was provided and allergies verified, the patient was positioned appropriately on thel bed.  The right shoulder to be aspirated and injected was prepped and draped in a sterile fashion.  The skin was anesthetized with ethyl chloride spray.  A joint aspiration was to be  performed an 18-gauge needle was used otherwise a 22-gauge needle was used to inject the appropriate joint.    Joint injection was performed with a mixture of 5 cc 1% lidocaine plain and 2 cc Celestone Soluspan 6 mg per mL.  The needle was removed and the puncture site closed and sealed with a Band-Aid.  The patient tolerated the procedure well.    Diagnostic studies: No new x-ray    Impression: Right shoulder acute injury while in the benchpress competition with shoulder pain bicep labral complex tearing    Plan: Injection today ice elevate therapy range of motion program we will see him back after MRI for surgical planning    L Inj/Asp: R subacromial bursa on 5/13/2024 11:05 AM  Indications: pain  Details: 22 G needle, posterior approach  Medications: 2 mL betamethasone acet,sod phos 6 mg/mL; 5 mL lidocaine 10 mg/mL (1 %)  Outcome: tolerated well, no immediate complications  Procedure, treatment alternatives, risks and benefits explained, specific risks discussed. Consent was given by the patient. Immediately prior to procedure a time out was called to verify the correct patient, procedure, equipment, support staff and site/side marked as required.

## 2024-05-23 ENCOUNTER — APPOINTMENT (OUTPATIENT)
Dept: RADIOLOGY | Facility: HOSPITAL | Age: 54
End: 2024-05-23
Payer: COMMERCIAL

## 2024-05-29 ENCOUNTER — HOSPITAL ENCOUNTER (OUTPATIENT)
Dept: RADIOLOGY | Facility: HOSPITAL | Age: 54
Discharge: HOME | End: 2024-05-29
Payer: COMMERCIAL

## 2024-05-29 DIAGNOSIS — M75.100 TEAR OF ROTATOR CUFF, UNSPECIFIED LATERALITY, UNSPECIFIED TEAR EXTENT, UNSPECIFIED WHETHER TRAUMATIC: ICD-10-CM

## 2024-05-29 DIAGNOSIS — M25.511 ACUTE PAIN OF RIGHT SHOULDER: ICD-10-CM

## 2024-05-29 PROCEDURE — 73221 MRI JOINT UPR EXTREM W/O DYE: CPT | Mod: RIGHT SIDE | Performed by: RADIOLOGY

## 2024-05-29 PROCEDURE — 73221 MRI JOINT UPR EXTREM W/O DYE: CPT | Mod: RT

## 2024-06-24 DIAGNOSIS — E66.9 OBESITY, CLASS I, BMI 30-34.9: Primary | ICD-10-CM

## 2024-06-24 DIAGNOSIS — E29.1 HYPOGONADISM IN MALE: ICD-10-CM

## 2024-06-24 RX ORDER — METFORMIN HYDROCHLORIDE 500 MG/1
500 TABLET ORAL DAILY
Qty: 90 TABLET | Refills: 1 | Status: SHIPPED | OUTPATIENT
Start: 2024-06-24 | End: 2025-06-24

## 2024-06-24 RX ORDER — TESTOSTERONE CYPIONATE 200 MG/ML
100 INJECTION, SOLUTION INTRAMUSCULAR
Qty: 2 ML | Refills: 5 | Status: SHIPPED | OUTPATIENT
Start: 2024-06-24

## 2024-11-06 ENCOUNTER — PATIENT MESSAGE (OUTPATIENT)
Dept: PRIMARY CARE | Facility: CLINIC | Age: 54
End: 2024-11-06
Payer: COMMERCIAL

## 2024-11-06 DIAGNOSIS — E66.811 OBESITY, CLASS I, BMI 30-34.9: ICD-10-CM

## 2024-11-07 RX ORDER — METFORMIN HYDROCHLORIDE 500 MG/1
500 TABLET ORAL DAILY
Qty: 90 TABLET | Refills: 0 | Status: SHIPPED | OUTPATIENT
Start: 2024-11-07 | End: 2025-11-07

## 2024-11-22 ENCOUNTER — PATIENT MESSAGE (OUTPATIENT)
Dept: PRIMARY CARE | Facility: CLINIC | Age: 54
End: 2024-11-22
Payer: COMMERCIAL

## 2024-11-22 DIAGNOSIS — E07.9 DISORDER OF THYROID: ICD-10-CM

## 2024-11-22 RX ORDER — LEVOTHYROXINE SODIUM 125 UG/1
125 TABLET ORAL DAILY
Qty: 90 TABLET | Refills: 1 | Status: SHIPPED | OUTPATIENT
Start: 2024-11-22

## 2025-01-01 ENCOUNTER — HOSPITAL ENCOUNTER (EMERGENCY)
Age: 55
Discharge: HOME | End: 2025-01-01
Payer: COMMERCIAL

## 2025-01-01 ENCOUNTER — APPOINTMENT (OUTPATIENT)
Dept: GENERAL RADIOLOGY | Age: 55
End: 2025-01-01
Payer: COMMERCIAL

## 2025-01-01 VITALS
RESPIRATION RATE: 13 BRPM | TEMPERATURE: 98.1 F | OXYGEN SATURATION: 95 % | SYSTOLIC BLOOD PRESSURE: 139 MMHG | DIASTOLIC BLOOD PRESSURE: 86 MMHG | HEART RATE: 93 BPM

## 2025-01-01 DIAGNOSIS — B33.8 RESPIRATORY SYNCYTIAL VIRUS (RSV): ICD-10-CM

## 2025-01-01 DIAGNOSIS — I48.91 ATRIAL FIBRILLATION WITH RAPID VENTRICULAR RESPONSE (HCC): Primary | ICD-10-CM

## 2025-01-01 LAB
ALBUMIN SERPL-MCNC: 4.4 G/DL (ref 3.5–4.6)
ALP SERPL-CCNC: 98 U/L (ref 35–104)
ALT SERPL-CCNC: 37 U/L (ref 0–41)
ANION GAP SERPL CALCULATED.3IONS-SCNC: 13 MEQ/L (ref 9–15)
APTT PPP: 24.3 SEC (ref 24.4–36.8)
AST SERPL-CCNC: 29 U/L (ref 0–40)
B PARAP IS1001 DNA NPH QL NAA+NON-PROBE: NOT DETECTED
B PERT.PT PRMT NPH QL NAA+NON-PROBE: NOT DETECTED
BASOPHILS # BLD: 0 K/UL (ref 0–0.2)
BASOPHILS NFR BLD: 0.3 %
BILIRUB SERPL-MCNC: <0.2 MG/DL (ref 0.2–0.7)
BUN SERPL-MCNC: 18 MG/DL (ref 6–20)
C PNEUM DNA NPH QL NAA+NON-PROBE: NOT DETECTED
CALCIUM SERPL-MCNC: 9.2 MG/DL (ref 8.5–9.9)
CHLORIDE SERPL-SCNC: 103 MEQ/L (ref 95–107)
CO2 SERPL-SCNC: 24 MEQ/L (ref 20–31)
CREAT SERPL-MCNC: 1.08 MG/DL (ref 0.7–1.2)
D DIMER PPP FEU-MCNC: 0.44 MG/L FEU (ref 0–0.5)
EOSINOPHIL # BLD: 0.2 K/UL (ref 0–0.7)
EOSINOPHIL NFR BLD: 2.7 %
ERYTHROCYTE [DISTWIDTH] IN BLOOD BY AUTOMATED COUNT: 12.6 % (ref 11.5–14.5)
FLUAV RNA NPH QL NAA+NON-PROBE: NOT DETECTED
FLUBV RNA NPH QL NAA+NON-PROBE: NOT DETECTED
GLOBULIN SER CALC-MCNC: 2.8 G/DL (ref 2.3–3.5)
GLUCOSE SERPL-MCNC: 213 MG/DL (ref 70–99)
HADV DNA NPH QL NAA+NON-PROBE: NOT DETECTED
HCOV 229E RNA NPH QL NAA+NON-PROBE: NOT DETECTED
HCOV HKU1 RNA NPH QL NAA+NON-PROBE: NOT DETECTED
HCOV NL63 RNA NPH QL NAA+NON-PROBE: NOT DETECTED
HCOV OC43 RNA NPH QL NAA+NON-PROBE: NOT DETECTED
HCT VFR BLD AUTO: 48.5 % (ref 42–52)
HGB BLD-MCNC: 17 G/DL (ref 14–18)
HMPV RNA NPH QL NAA+NON-PROBE: NOT DETECTED
HPIV1 RNA NPH QL NAA+NON-PROBE: NOT DETECTED
HPIV2 RNA NPH QL NAA+NON-PROBE: NOT DETECTED
HPIV3 RNA NPH QL NAA+NON-PROBE: NOT DETECTED
HPIV4 RNA NPH QL NAA+NON-PROBE: NOT DETECTED
INR PPP: 0.9
LYMPHOCYTES # BLD: 2.4 K/UL (ref 1–4.8)
LYMPHOCYTES NFR BLD: 36.1 %
M PNEUMO DNA NPH QL NAA+NON-PROBE: NOT DETECTED
MAGNESIUM SERPL-MCNC: 2.1 MG/DL (ref 1.7–2.4)
MCH RBC QN AUTO: 30.2 PG (ref 27–31.3)
MCHC RBC AUTO-ENTMCNC: 35.1 % (ref 33–37)
MCV RBC AUTO: 86.3 FL (ref 79–92.2)
MONOCYTES # BLD: 0.6 K/UL (ref 0.2–0.8)
MONOCYTES NFR BLD: 9.4 %
NEUTROPHILS # BLD: 3.4 K/UL (ref 1.4–6.5)
NEUTS SEG NFR BLD: 51.3 %
PLATELET # BLD AUTO: 218 K/UL (ref 130–400)
POTASSIUM SERPL-SCNC: 4 MEQ/L (ref 3.4–4.9)
PROT SERPL-MCNC: 7.2 G/DL (ref 6.3–8)
PROTHROMBIN TIME: 12.6 SEC (ref 12.3–14.9)
RBC # BLD AUTO: 5.62 M/UL (ref 4.7–6.1)
RSV RNA NPH QL NAA+NON-PROBE: DETECTED
RV+EV RNA NPH QL NAA+NON-PROBE: NOT DETECTED
SARS-COV-2 RNA NPH QL NAA+NON-PROBE: NOT DETECTED
SODIUM SERPL-SCNC: 140 MEQ/L (ref 135–144)
T4 FREE SERPL-MCNC: 1.07 NG/DL (ref 0.84–1.68)
TROPONIN, HIGH SENSITIVITY: 9 NG/L (ref 0–19)
TROPONIN, HIGH SENSITIVITY: 9 NG/L (ref 0–19)
TSH REFLEX: 4.33 UIU/ML (ref 0.44–3.86)
WBC # BLD AUTO: 6.6 K/UL (ref 4.8–10.8)

## 2025-01-01 PROCEDURE — 85379 FIBRIN DEGRADATION QUANT: CPT

## 2025-01-01 PROCEDURE — 80053 COMPREHEN METABOLIC PANEL: CPT

## 2025-01-01 PROCEDURE — 96374 THER/PROPH/DIAG INJ IV PUSH: CPT

## 2025-01-01 PROCEDURE — 2580000003 HC RX 258

## 2025-01-01 PROCEDURE — 85730 THROMBOPLASTIN TIME PARTIAL: CPT

## 2025-01-01 PROCEDURE — 85610 PROTHROMBIN TIME: CPT

## 2025-01-01 PROCEDURE — 93005 ELECTROCARDIOGRAM TRACING: CPT | Performed by: EMERGENCY MEDICINE

## 2025-01-01 PROCEDURE — 99285 EMERGENCY DEPT VISIT HI MDM: CPT

## 2025-01-01 PROCEDURE — 84443 ASSAY THYROID STIM HORMONE: CPT

## 2025-01-01 PROCEDURE — 84439 ASSAY OF FREE THYROXINE: CPT

## 2025-01-01 PROCEDURE — 85025 COMPLETE CBC W/AUTO DIFF WBC: CPT

## 2025-01-01 PROCEDURE — 71045 X-RAY EXAM CHEST 1 VIEW: CPT

## 2025-01-01 PROCEDURE — 96376 TX/PRO/DX INJ SAME DRUG ADON: CPT

## 2025-01-01 PROCEDURE — 2500000003 HC RX 250 WO HCPCS

## 2025-01-01 PROCEDURE — 84484 ASSAY OF TROPONIN QUANT: CPT

## 2025-01-01 PROCEDURE — 83735 ASSAY OF MAGNESIUM: CPT

## 2025-01-01 PROCEDURE — 0202U NFCT DS 22 TRGT SARS-COV-2: CPT

## 2025-01-01 RX ORDER — DILTIAZEM HYDROCHLORIDE 5 MG/ML
10 INJECTION INTRAVENOUS ONCE
Status: COMPLETED | OUTPATIENT
Start: 2025-01-01 | End: 2025-01-01

## 2025-01-01 RX ORDER — METOPROLOL SUCCINATE 25 MG/1
25 TABLET, EXTENDED RELEASE ORAL 2 TIMES DAILY
Qty: 60 TABLET | Refills: 0 | Status: SHIPPED | OUTPATIENT
Start: 2025-01-01 | End: 2025-01-31

## 2025-01-01 RX ORDER — 0.9 % SODIUM CHLORIDE 0.9 %
1000 INTRAVENOUS SOLUTION INTRAVENOUS ONCE
Status: COMPLETED | OUTPATIENT
Start: 2025-01-01 | End: 2025-01-01

## 2025-01-01 RX ADMIN — SODIUM CHLORIDE 1000 ML: 9 INJECTION, SOLUTION INTRAVENOUS at 05:02

## 2025-01-01 RX ADMIN — DILTIAZEM HYDROCHLORIDE 10 MG: 5 INJECTION, SOLUTION INTRAVENOUS at 05:53

## 2025-01-01 RX ADMIN — DILTIAZEM HYDROCHLORIDE 10 MG: 5 INJECTION, SOLUTION INTRAVENOUS at 05:00

## 2025-01-01 ASSESSMENT — ENCOUNTER SYMPTOMS
VOMITING: 0
DIARRHEA: 0
NAUSEA: 0
BACK PAIN: 0
ABDOMINAL PAIN: 0
SHORTNESS OF BREATH: 1

## 2025-01-01 ASSESSMENT — LIFESTYLE VARIABLES
HOW OFTEN DO YOU HAVE A DRINK CONTAINING ALCOHOL: MONTHLY OR LESS
HOW MANY STANDARD DRINKS CONTAINING ALCOHOL DO YOU HAVE ON A TYPICAL DAY: 1 OR 2

## 2025-01-01 ASSESSMENT — PAIN - FUNCTIONAL ASSESSMENT: PAIN_FUNCTIONAL_ASSESSMENT: NONE - DENIES PAIN

## 2025-01-01 NOTE — ED TRIAGE NOTES
Patient states he has had chest pain on and off for the last two days. Tonight the pain woke him up from sleeping.

## 2025-01-01 NOTE — ED PROVIDER NOTES
chest pain and palpitations.   Gastrointestinal:  Negative for abdominal pain, diarrhea, nausea and vomiting.   Genitourinary:  Negative for dysuria.   Musculoskeletal:  Positive for myalgias. Negative for back pain and neck pain.   Neurological:  Positive for dizziness. Negative for weakness, light-headedness and numbness.       Except as noted above the remainder of the review of systems was reviewed and negative.       PAST MEDICAL HISTORY     Past Medical History:   Diagnosis Date    Depression     Thyroid disease          SURGICAL HISTORY     History reviewed. No pertinent surgical history.      CURRENT MEDICATIONS       Discharge Medication List as of 1/1/2025  6:47 AM        CONTINUE these medications which have NOT CHANGED    Details   SYNTHROID 125 MCG tablet TAKE ONE TABLET BY MOUTH EVERY DAY, Disp-30 tablet, R-0, DAWNormal      testosterone cypionate (DEPOTESTOTERONE CYPIONATE) 200 MG/ML injection Inject 1 mL into the muscle every 7 days for 90 days., Disp-4 mL, R-2Normal      atorvastatin (LIPITOR) 10 MG tablet Take 1 tablet by mouth daily, Disp-90 tablet,R-3Normal      omeprazole (PRILOSEC) 40 MG delayed release capsule Take 1 capsule by mouth daily, Disp-30 capsule, R-3Normal             ALLERGIES     Patient has no known allergies.    FAMILY HISTORY       Family History   Problem Relation Age of Onset    Diabetes Mother     Heart Disease Mother     Diabetes Father     Heart Disease Father     Diabetes Brother     Heart Disease Maternal Grandmother     Heart Disease Paternal Grandfather     Diabetes Paternal Grandfather           SOCIAL HISTORY       Social History     Socioeconomic History    Marital status:      Spouse name: None    Number of children: None    Years of education: None    Highest education level: None   Tobacco Use    Smoking status: Never    Smokeless tobacco: Current     Types: Chew   Substance and Sexual Activity    Alcohol use: Not Currently    Drug use: Never     Social    TROPONIN   CBC WITH AUTO DIFFERENTIAL   D-DIMER, QUANTITATIVE   PROTIME-INR   MAGNESIUM   T4, FREE       All other labs were within normal range or not returned as of this dictation.    EMERGENCY DEPARTMENT COURSE and DIFFERENTIAL DIAGNOSIS/MDM:   Vitals:    Vitals:    01/01/25 0421 01/01/25 0430 01/01/25 0553 01/01/25 0600   BP: (!) 157/86 133/86 (!) 154/97 139/86   Pulse: (!) 135 (!) 136 (!) 115 93   Resp: 20 23 13   Temp: 98.1 °F (36.7 °C)      TempSrc: Oral      SpO2: 97% 96%  95%       Medical Decision Making  54 y.o. male with past medical history of diabetes, hypothyroidism, unspecified chest pain, coronary arthrosclerosis, ED, hyperlipidemia, anxiety, and depression who presents to the emergency department with chest pain for the past 2 days on and off.  Patient was hemodynamically stable, neurovascularly intact, afebrile, and not toxic appearing on exam.  Patient presented with tachycardia and tachypnea.  Initial EKG showed atrial fibrillation with RVR.  Chest x-ray and troponins were negative.  Low suspicion for ACS.  D-dimer was negative.  Low suspicion for PE.  CBC and CMP negative for anemia, leukocytosis, or other electrolyte abnormality.  TSH was elevated at 4.3 this is not surprising due to patient not taking Synthroid for the past 4 days.  Patient given 1 L of fluids and 2 separate doses of 10 mg of Cardizem for rate control.  Patient's pulse was able to come down to the 90s.  Patient feeling much better at this time stating that his lightheadedness has dissipated and palpitations/chest pain symptoms have resolved.  Discussed with patient his [id number] score of 1.  Utilized shared decision making on holding off on anticoagulation at this time due to low risk.  Patient given follow-up with Dr. Abrams to evaluate his A-fib further.  Patient was given a prescription for metoprolol for rate control at home.  Patient positive for RSV infection.  Patient is to follow-up with his primary care doctor

## 2025-01-03 LAB
EKG ATRIAL RATE: 138 BPM
EKG Q-T INTERVAL: 322 MS
EKG QRS DURATION: 86 MS
EKG QTC CALCULATION (BAZETT): 483 MS
EKG R AXIS: 42 DEGREES
EKG T AXIS: 268 DEGREES
EKG VENTRICULAR RATE: 135 BPM

## 2025-01-06 ENCOUNTER — PATIENT MESSAGE (OUTPATIENT)
Dept: PRIMARY CARE | Facility: CLINIC | Age: 55
End: 2025-01-06
Payer: COMMERCIAL

## 2025-01-06 ENCOUNTER — OFFICE VISIT (OUTPATIENT)
Dept: CARDIOLOGY CLINIC | Age: 55
End: 2025-01-06
Payer: COMMERCIAL

## 2025-01-06 VITALS
WEIGHT: 235 LBS | DIASTOLIC BLOOD PRESSURE: 90 MMHG | SYSTOLIC BLOOD PRESSURE: 140 MMHG | OXYGEN SATURATION: 96 % | RESPIRATION RATE: 16 BRPM | BODY MASS INDEX: 34.7 KG/M2

## 2025-01-06 DIAGNOSIS — R73.9 HYPERGLYCEMIA: ICD-10-CM

## 2025-01-06 DIAGNOSIS — R00.2 PALPITATIONS: Primary | ICD-10-CM

## 2025-01-06 DIAGNOSIS — E78.5 HYPERLIPIDEMIA, UNSPECIFIED HYPERLIPIDEMIA TYPE: ICD-10-CM

## 2025-01-06 DIAGNOSIS — F41.9 ANXIETY: Primary | ICD-10-CM

## 2025-01-06 DIAGNOSIS — I48.91 ATRIAL FIBRILLATION, UNSPECIFIED TYPE (HCC): ICD-10-CM

## 2025-01-06 DIAGNOSIS — Z12.5 PROSTATE CANCER SCREENING: ICD-10-CM

## 2025-01-06 DIAGNOSIS — I20.9 ANGINA PECTORIS (HCC): ICD-10-CM

## 2025-01-06 DIAGNOSIS — R94.31 ABNORMAL ELECTROCARDIOGRAPHY: ICD-10-CM

## 2025-01-06 PROCEDURE — 99204 OFFICE O/P NEW MOD 45 MIN: CPT | Performed by: INTERNAL MEDICINE

## 2025-01-06 PROCEDURE — 3017F COLORECTAL CA SCREEN DOC REV: CPT | Performed by: INTERNAL MEDICINE

## 2025-01-06 PROCEDURE — 4004F PT TOBACCO SCREEN RCVD TLK: CPT | Performed by: INTERNAL MEDICINE

## 2025-01-06 PROCEDURE — G8417 CALC BMI ABV UP PARAM F/U: HCPCS | Performed by: INTERNAL MEDICINE

## 2025-01-06 PROCEDURE — 93000 ELECTROCARDIOGRAM COMPLETE: CPT | Performed by: INTERNAL MEDICINE

## 2025-01-06 PROCEDURE — G8427 DOCREV CUR MEDS BY ELIG CLIN: HCPCS | Performed by: INTERNAL MEDICINE

## 2025-01-06 RX ORDER — METOPROLOL SUCCINATE 25 MG/1
25 TABLET, EXTENDED RELEASE ORAL 2 TIMES DAILY
Qty: 90 TABLET | Refills: 5 | Status: SHIPPED | OUTPATIENT
Start: 2025-01-06 | End: 2025-01-06 | Stop reason: SDUPTHER

## 2025-01-06 RX ORDER — METOPROLOL SUCCINATE 25 MG/1
25 TABLET, EXTENDED RELEASE ORAL DAILY
Qty: 90 TABLET | Refills: 5 | Status: SHIPPED | OUTPATIENT
Start: 2025-01-06 | End: 2026-06-30

## 2025-01-06 ASSESSMENT — ENCOUNTER SYMPTOMS
CONSTIPATION: 0
VOMITING: 0
COLOR CHANGE: 0
NAUSEA: 0
ABDOMINAL PAIN: 0
COUGH: 0
DIARRHEA: 0
APNEA: 0
EYE REDNESS: 0
SHORTNESS OF BREATH: 0
RHINORRHEA: 0
CHEST TIGHTNESS: 0
WHEEZING: 0

## 2025-01-06 NOTE — PROGRESS NOTES
Chief Complaint   Patient presents with    New Patient       Patient presents for initial medical evaluation. Patient is followed on a regular basis by Jeaneth Hardy MD.     Family history of diabetes patient on metformin  Hypothyroidism  Hyperlipidemia  DEVAN on CPAP  Paroxysmal atrial fibrillation detected on 1/1/2025  Tobacco abuse patient chews tobacco  ==========  1/6/2025  First clinic visit referred to our office for management of paroxysmal atrial fibrillation  At the beginning of the year patient went to the ER for palpitations and chest pain  Patient was found in atrial fibrillation newly diagnosed  Patient was given metoprolol  Currently EKG today sinus rhythm no signs of ischemia  Patient had initially presented to [facility] for this same issue back in March of last year  Patient reports that he was instructed to obtain an echocardiogram and a stress test both denied by his insurance  Patient is very physically active he lifts weights he runs  Patient goes to a local gym 4-5 times per week  When he goes to the gym sometimes he does cardio exercises next for the last 4 months he feels short of breath and endorses palpitations  Also when he experiences these palpitations he feels this chest pain that is like a pain he is able to pinpoint his pain with a finger  Troponins negative x 2          Patient Active Problem List   Diagnosis    Depression    Anxiety    Hypothyroidism    Lumbar radicular pain    Palpitations       No past surgical history on file.    Social History     Socioeconomic History    Marital status:      Spouse name: None    Number of children: None    Years of education: None    Highest education level: None   Tobacco Use    Smoking status: Never    Smokeless tobacco: Current     Types: Chew   Substance and Sexual Activity    Alcohol use: Not Currently    Drug use: Never     Social Determinants of Health     Financial Resource Strain: Low Risk  (9/10/2020)    Overall

## 2025-01-15 DIAGNOSIS — Z01.89 PATIENT REQUEST FOR DIAGNOSTIC TESTING: ICD-10-CM

## 2025-01-15 DIAGNOSIS — E66.811 OBESITY, CLASS I, BMI 30-34.9: ICD-10-CM

## 2025-01-15 RX ORDER — METFORMIN HYDROCHLORIDE 500 MG/1
500 TABLET ORAL DAILY
Qty: 90 TABLET | Refills: 0 | Status: SHIPPED | OUTPATIENT
Start: 2025-01-15 | End: 2026-01-15

## 2025-01-20 ENCOUNTER — LAB (OUTPATIENT)
Dept: LAB | Facility: LAB | Age: 55
End: 2025-01-20
Payer: COMMERCIAL

## 2025-01-20 ENCOUNTER — HOSPITAL ENCOUNTER (OUTPATIENT)
Dept: NUCLEAR MEDICINE | Age: 55
Discharge: HOME | End: 2025-01-22
Attending: INTERNAL MEDICINE

## 2025-01-20 ENCOUNTER — HOSPITAL ENCOUNTER (OUTPATIENT)
Age: 55
Discharge: HOME | End: 2025-01-22
Attending: INTERNAL MEDICINE

## 2025-01-20 DIAGNOSIS — R94.31 ABNORMAL ELECTROCARDIOGRAPHY: ICD-10-CM

## 2025-01-20 DIAGNOSIS — E29.1 HYPOGONADISM MALE: ICD-10-CM

## 2025-01-20 DIAGNOSIS — I48.91 ATRIAL FIBRILLATION, UNSPECIFIED TYPE (HCC): ICD-10-CM

## 2025-01-20 DIAGNOSIS — Z01.89 PATIENT REQUEST FOR DIAGNOSTIC TESTING: ICD-10-CM

## 2025-01-20 DIAGNOSIS — R79.89 ELEVATED LFTS: ICD-10-CM

## 2025-01-20 DIAGNOSIS — E78.5 HYPERLIPIDEMIA, UNSPECIFIED HYPERLIPIDEMIA TYPE: ICD-10-CM

## 2025-01-20 DIAGNOSIS — I20.9 ANGINA PECTORIS (HCC): ICD-10-CM

## 2025-01-20 DIAGNOSIS — Z12.5 PROSTATE CANCER SCREENING: ICD-10-CM

## 2025-01-20 DIAGNOSIS — R73.9 HYPERGLYCEMIA: ICD-10-CM

## 2025-01-20 DIAGNOSIS — F41.9 ANXIETY: ICD-10-CM

## 2025-01-20 LAB
ALBUMIN SERPL BCP-MCNC: 4.6 G/DL (ref 3.4–5)
ALP SERPL-CCNC: 53 U/L (ref 33–120)
ALT SERPL W P-5'-P-CCNC: 28 U/L (ref 10–52)
ANION GAP SERPL CALC-SCNC: 13 MMOL/L (ref 10–20)
AST SERPL W P-5'-P-CCNC: 23 U/L (ref 9–39)
BASOPHILS # BLD AUTO: 0.02 X10*3/UL (ref 0–0.1)
BASOPHILS NFR BLD AUTO: 0.4 %
BILIRUB DIRECT SERPL-MCNC: 0.1 MG/DL (ref 0–0.3)
BILIRUB SERPL-MCNC: 0.7 MG/DL (ref 0–1.2)
BUN SERPL-MCNC: 13 MG/DL (ref 6–23)
CALCIUM SERPL-MCNC: 9.7 MG/DL (ref 8.6–10.3)
CHLORIDE SERPL-SCNC: 103 MMOL/L (ref 98–107)
CHOLEST SERPL-MCNC: 200 MG/DL (ref 0–199)
CHOLESTEROL/HDL RATIO: 5.7
CO2 SERPL-SCNC: 27 MMOL/L (ref 21–32)
CREAT SERPL-MCNC: 1.26 MG/DL (ref 0.5–1.3)
EGFRCR SERPLBLD CKD-EPI 2021: 67 ML/MIN/1.73M*2
EOSINOPHIL # BLD AUTO: 0.08 X10*3/UL (ref 0–0.7)
EOSINOPHIL NFR BLD AUTO: 1.8 %
ERYTHROCYTE [DISTWIDTH] IN BLOOD BY AUTOMATED COUNT: 13.4 % (ref 11.5–14.5)
EST. AVERAGE GLUCOSE BLD GHB EST-MCNC: 126 MG/DL
ESTRADIOL SERPL-MCNC: 34 PG/ML
GLUCOSE SERPL-MCNC: 106 MG/DL (ref 74–99)
HBA1C MFR BLD: 6 %
HCT VFR BLD AUTO: 48.9 % (ref 41–52)
HDLC SERPL-MCNC: 35.2 MG/DL
HGB BLD-MCNC: 16.9 G/DL (ref 13.5–17.5)
IMM GRANULOCYTES # BLD AUTO: 0.01 X10*3/UL (ref 0–0.7)
IMM GRANULOCYTES NFR BLD AUTO: 0.2 % (ref 0–0.9)
LDLC SERPL CALC-MCNC: 145 MG/DL
LYMPHOCYTES # BLD AUTO: 1.35 X10*3/UL (ref 1.2–4.8)
LYMPHOCYTES NFR BLD AUTO: 30.3 %
MCH RBC QN AUTO: 29.8 PG (ref 26–34)
MCHC RBC AUTO-ENTMCNC: 34.6 G/DL (ref 32–36)
MCV RBC AUTO: 86 FL (ref 80–100)
MONOCYTES # BLD AUTO: 0.33 X10*3/UL (ref 0.1–1)
MONOCYTES NFR BLD AUTO: 7.4 %
NEUTROPHILS # BLD AUTO: 2.67 X10*3/UL (ref 1.2–7.7)
NEUTROPHILS NFR BLD AUTO: 59.9 %
NON HDL CHOLESTEROL: 165 MG/DL (ref 0–149)
NRBC BLD-RTO: 0 /100 WBCS (ref 0–0)
PLATELET # BLD AUTO: 237 X10*3/UL (ref 150–450)
POTASSIUM SERPL-SCNC: 4.5 MMOL/L (ref 3.5–5.3)
PROT SERPL-MCNC: 7.3 G/DL (ref 6.4–8.2)
PSA SERPL-MCNC: 1.11 NG/ML
RBC # BLD AUTO: 5.68 X10*6/UL (ref 4.5–5.9)
SODIUM SERPL-SCNC: 138 MMOL/L (ref 136–145)
T4 FREE SERPL-MCNC: 1.35 NG/DL (ref 0.61–1.12)
TRIGL SERPL-MCNC: 97 MG/DL (ref 0–149)
TSH SERPL-ACNC: 1.35 MIU/L (ref 0.44–3.98)
VLDL: 19 MG/DL (ref 0–40)
WBC # BLD AUTO: 4.5 X10*3/UL (ref 4.4–11.3)

## 2025-01-20 PROCEDURE — 82670 ASSAY OF TOTAL ESTRADIOL: CPT

## 2025-01-20 PROCEDURE — 84439 ASSAY OF FREE THYROXINE: CPT

## 2025-01-20 PROCEDURE — 84402 ASSAY OF FREE TESTOSTERONE: CPT

## 2025-01-20 PROCEDURE — 85025 COMPLETE CBC W/AUTO DIFF WBC: CPT

## 2025-01-20 PROCEDURE — 80061 LIPID PANEL: CPT

## 2025-01-20 PROCEDURE — 83036 HEMOGLOBIN GLYCOSYLATED A1C: CPT

## 2025-01-20 PROCEDURE — 82248 BILIRUBIN DIRECT: CPT

## 2025-01-20 PROCEDURE — 84443 ASSAY THYROID STIM HORMONE: CPT

## 2025-01-20 PROCEDURE — 36415 COLL VENOUS BLD VENIPUNCTURE: CPT

## 2025-01-20 PROCEDURE — 80053 COMPREHEN METABOLIC PANEL: CPT

## 2025-01-20 PROCEDURE — G0103 PSA SCREENING: HCPCS

## 2025-01-20 RX ORDER — SODIUM CHLORIDE 0.9 % (FLUSH) 0.9 %
10 SYRINGE (ML) INJECTION PRN
Status: DISCONTINUED | OUTPATIENT
Start: 2025-01-20 | End: 2025-01-23 | Stop reason: HOSPADM

## 2025-01-20 RX ORDER — REGADENOSON 0.08 MG/ML
0.4 INJECTION, SOLUTION INTRAVENOUS
Status: DISCONTINUED | OUTPATIENT
Start: 2025-01-20 | End: 2025-01-23 | Stop reason: HOSPADM

## 2025-01-22 RX ORDER — METFORMIN HYDROCHLORIDE 500 MG/1
500 TABLET ORAL
Qty: 180 TABLET | Refills: 0 | Status: SHIPPED | OUTPATIENT
Start: 2025-01-22

## 2025-01-24 LAB
TESTOSTERONE FREE (CHAN): 39.2 PG/ML (ref 35–155)
TESTOSTERONE,TOTAL,LC-MS/MS: 178 NG/DL (ref 250–1100)

## 2025-01-31 DIAGNOSIS — E29.1 HYPOGONADISM IN MALE: ICD-10-CM

## 2025-02-03 DIAGNOSIS — R94.31 ABNORMAL ELECTROCARDIOGRAPHY: Primary | ICD-10-CM

## 2025-02-03 DIAGNOSIS — I20.9 ANGINA PECTORIS (HCC): ICD-10-CM

## 2025-02-21 ENCOUNTER — HOSPITAL ENCOUNTER (OUTPATIENT)
Dept: NUCLEAR MEDICINE | Age: 55
Discharge: HOME | End: 2025-02-23
Attending: INTERNAL MEDICINE
Payer: COMMERCIAL

## 2025-02-21 ENCOUNTER — HOSPITAL ENCOUNTER (OUTPATIENT)
Age: 55
Discharge: HOME | End: 2025-02-23
Attending: INTERNAL MEDICINE
Payer: COMMERCIAL

## 2025-02-21 VITALS
HEIGHT: 68 IN | WEIGHT: 235 LBS | DIASTOLIC BLOOD PRESSURE: 90 MMHG | SYSTOLIC BLOOD PRESSURE: 140 MMHG | BODY MASS INDEX: 35.61 KG/M2

## 2025-02-21 DIAGNOSIS — I20.9 ANGINA PECTORIS: ICD-10-CM

## 2025-02-21 DIAGNOSIS — R94.31 ABNORMAL ELECTROCARDIOGRAPHY: ICD-10-CM

## 2025-02-21 LAB
ECHO AV AREA PEAK VELOCITY: 3.4 CM2
ECHO AV AREA VTI: 3 CM2
ECHO AV AREA/BSA VTI: 1.4 CM2/M2
ECHO AV CUSP MM: 2.3 CM
ECHO AV MEAN GRADIENT: 4 MMHG
ECHO AV MEAN VELOCITY: 0.9 M/S
ECHO AV PEAK GRADIENT: 7 MMHG
ECHO AV PEAK GRADIENT: 7 MMHG
ECHO AV PEAK VELOCITY: 1.3 M/S
ECHO AV PEAK VELOCITY: 1.3 M/S
ECHO AV VTI: 30.8 CM
ECHO BSA: 2.27 M2
ECHO EST RA PRESSURE: 3 MMHG
ECHO LA DIAMETER INDEX: 1.59 CM/M2
ECHO LA DIAMETER: 3.5 CM
ECHO LA VOL A-L A2C: 64 ML (ref 18–58)
ECHO LA VOL A-L A4C: 40 ML (ref 18–58)
ECHO LA VOL MOD A2C: 62 ML (ref 18–58)
ECHO LA VOL MOD A4C: 38 ML (ref 18–58)
ECHO LA VOLUME AREA LENGTH: 54 ML
ECHO LA VOLUME INDEX A-L A2C: 29 ML/M2 (ref 16–34)
ECHO LA VOLUME INDEX A-L A4C: 18 ML/M2 (ref 16–34)
ECHO LA VOLUME INDEX AREA LENGTH: 25 ML/M2 (ref 16–34)
ECHO LA VOLUME INDEX MOD A2C: 28 ML/M2 (ref 16–34)
ECHO LA VOLUME INDEX MOD A4C: 17 ML/M2 (ref 16–34)
ECHO LV E' LATERAL VELOCITY: 8.96 CM/S
ECHO LV E' SEPTAL VELOCITY: 8.01 CM/S
ECHO LV EDV A2C: 108 ML
ECHO LV EDV A4C: 74 ML
ECHO LV EDV BP: 92 ML (ref 67–155)
ECHO LV EDV INDEX A4C: 34 ML/M2
ECHO LV EDV INDEX BP: 42 ML/M2
ECHO LV EDV NDEX A2C: 49 ML/M2
ECHO LV EF PHYSICIAN: 60 %
ECHO LV EJECTION FRACTION A2C: 65 %
ECHO LV EJECTION FRACTION A4C: 72 %
ECHO LV EJECTION FRACTION BIPLANE: 69 % (ref 55–100)
ECHO LV ESV A2C: 38 ML
ECHO LV ESV A4C: 20 ML
ECHO LV ESV BP: 29 ML (ref 22–58)
ECHO LV ESV INDEX A2C: 17 ML/M2
ECHO LV ESV INDEX A4C: 9 ML/M2
ECHO LV ESV INDEX BP: 13 ML/M2
ECHO LV FRACTIONAL SHORTENING: 43 % (ref 28–44)
ECHO LV INTERNAL DIMENSION DIASTOLE INDEX: 2.14 CM/M2
ECHO LV INTERNAL DIMENSION DIASTOLIC: 4.7 CM (ref 4.2–5.9)
ECHO LV INTERNAL DIMENSION SYSTOLIC INDEX: 1.23 CM/M2
ECHO LV INTERNAL DIMENSION SYSTOLIC: 2.7 CM
ECHO LV IVSD: 1.3 CM (ref 0.6–1)
ECHO LV IVSS: 2.4 CM
ECHO LV MASS 2D: 199.6 G (ref 88–224)
ECHO LV MASS INDEX 2D: 90.7 G/M2 (ref 49–115)
ECHO LV POSTERIOR WALL DIASTOLIC: 1 CM (ref 0.6–1)
ECHO LV POSTERIOR WALL SYSTOLIC: 1.3 CM
ECHO LV RELATIVE WALL THICKNESS RATIO: 0.43
ECHO LVOT AREA: 3.1 CM2
ECHO LVOT AV VTI INDEX: 0.91
ECHO LVOT DIAM: 2 CM
ECHO LVOT MEAN GRADIENT: 4 MMHG
ECHO LVOT PEAK GRADIENT: 7 MMHG
ECHO LVOT PEAK GRADIENT: 7 MMHG
ECHO LVOT PEAK VELOCITY: 1.4 M/S
ECHO LVOT PEAK VELOCITY: 1.4 M/S
ECHO LVOT STROKE VOLUME INDEX: 40.1 ML/M2
ECHO LVOT SV: 88.2 ML
ECHO LVOT VTI: 28.1 CM
ECHO MV A VELOCITY: 0.77 M/S
ECHO MV AREA VTI: 2.6 CM2
ECHO MV E DECELERATION TIME (DT): 237.9 MS
ECHO MV E VELOCITY: 0.92 M/S
ECHO MV E/A RATIO: 1.19
ECHO MV E/E' LATERAL: 10.27
ECHO MV E/E' RATIO (AVERAGED): 10.88
ECHO MV E/E' SEPTAL: 11.49
ECHO MV LVOT VTI INDEX: 1.19
ECHO MV MAX VELOCITY: 1 M/S
ECHO MV MEAN GRADIENT: 2 MMHG
ECHO MV MEAN VELOCITY: 0.6 M/S
ECHO MV PEAK GRADIENT: 4 MMHG
ECHO MV VTI: 33.3 CM
ECHO PV MAX VELOCITY: 1.1 M/S
ECHO PV PEAK GRADIENT: 5 MMHG
ECHO RIGHT VENTRICULAR SYSTOLIC PRESSURE (RVSP): 20 MMHG
ECHO RV INTERNAL DIMENSION: 3.1 CM
ECHO RV TAPSE: 2.2 CM (ref 1.7–?)
ECHO TV REGURGITANT MAX VELOCITY: 2.05 M/S
ECHO TV REGURGITANT PEAK GRADIENT: 17 MMHG

## 2025-02-21 PROCEDURE — 2500000003 HC RX 250 WO HCPCS: Performed by: INTERNAL MEDICINE

## 2025-02-21 PROCEDURE — 6360000002 HC RX W HCPCS: Performed by: INTERNAL MEDICINE

## 2025-02-21 PROCEDURE — 78452 HT MUSCLE IMAGE SPECT MULT: CPT

## 2025-02-21 PROCEDURE — 93017 CV STRESS TEST TRACING ONLY: CPT

## 2025-02-21 PROCEDURE — 93306 TTE W/DOPPLER COMPLETE: CPT

## 2025-02-21 PROCEDURE — 3430000000 HC RX DIAGNOSTIC RADIOPHARMACEUTICAL: Performed by: INTERNAL MEDICINE

## 2025-02-21 PROCEDURE — A9502 TC99M TETROFOSMIN: HCPCS | Performed by: INTERNAL MEDICINE

## 2025-02-21 PROCEDURE — 93306 TTE W/DOPPLER COMPLETE: CPT | Performed by: INTERNAL MEDICINE

## 2025-02-21 RX ORDER — REGADENOSON 0.08 MG/ML
0.4 INJECTION, SOLUTION INTRAVENOUS
Status: COMPLETED | OUTPATIENT
Start: 2025-02-21 | End: 2025-02-21

## 2025-02-21 RX ORDER — SODIUM CHLORIDE 0.9 % (FLUSH) 0.9 %
10 SYRINGE (ML) INJECTION PRN
Status: DISCONTINUED | OUTPATIENT
Start: 2025-02-21 | End: 2025-02-24 | Stop reason: HOSPADM

## 2025-02-21 RX ADMIN — TETROFOSMIN 34.2 MILLICURIE: 1.38 INJECTION, POWDER, LYOPHILIZED, FOR SOLUTION INTRAVENOUS at 10:08

## 2025-02-21 RX ADMIN — TETROFOSMIN 11.8 MILLICURIE: 1.38 INJECTION, POWDER, LYOPHILIZED, FOR SOLUTION INTRAVENOUS at 09:00

## 2025-02-21 RX ADMIN — Medication 10 ML: at 09:00

## 2025-02-21 RX ADMIN — Medication 10 ML: at 10:07

## 2025-02-21 RX ADMIN — Medication 10 ML: at 10:09

## 2025-02-21 RX ADMIN — REGADENOSON 0.4 MG: 0.08 INJECTION, SOLUTION INTRAVENOUS at 10:08

## 2025-02-23 LAB
ECHO BSA: 2.27 M2
NUC STRESS EJECTION FRACTION: 73 %
STRESS BASELINE DIAS BP: 88 MMHG
STRESS BASELINE HR: 65 BPM
STRESS BASELINE ST DEPRESSION: 0 MM
STRESS BASELINE SYS BP: 133 MMHG
STRESS ESTIMATED WORKLOAD: 1 METS
STRESS PEAK DIAS BP: 81 MMHG
STRESS PEAK SYS BP: 161 MMHG
STRESS PERCENT HR ACHIEVED: 58 %
STRESS POST PEAK HR: 95 BPM
STRESS RATE PRESSURE PRODUCT: NORMAL BPM*MMHG
STRESS ST DEPRESSION: 0 MM
STRESS TARGET HR: 164 BPM
TID: 1.08

## 2025-02-23 PROCEDURE — 93018 CV STRESS TEST I&R ONLY: CPT | Performed by: INTERNAL MEDICINE

## 2025-03-24 DIAGNOSIS — R07.89 OTHER CHEST PAIN: ICD-10-CM

## 2025-03-24 DIAGNOSIS — R73.9 HYPERGLYCEMIA: ICD-10-CM

## 2025-03-24 DIAGNOSIS — E03.9 HYPOTHYROIDISM, UNSPECIFIED TYPE: ICD-10-CM

## 2025-03-24 DIAGNOSIS — E29.1 HYPOGONADISM IN MALE: ICD-10-CM

## 2025-03-24 DIAGNOSIS — K21.9 GASTROESOPHAGEAL REFLUX DISEASE WITHOUT ESOPHAGITIS: ICD-10-CM

## 2025-03-24 RX ORDER — TESTOSTERONE CYPIONATE 200 MG/ML
INJECTION, SOLUTION INTRAMUSCULAR
Qty: 2 ML | Refills: 0 | OUTPATIENT
Start: 2025-03-24

## 2025-03-24 RX ORDER — OMEPRAZOLE 40 MG/1
40 CAPSULE, DELAYED RELEASE ORAL DAILY
Qty: 90 CAPSULE | Refills: 0 | Status: SHIPPED | OUTPATIENT
Start: 2025-03-24

## 2025-03-24 NOTE — TELEPHONE ENCOUNTER
[facility] pharmacy requested a refill on Omeprazole.  A 90 day supply has been sent to Dr Sands for approval.  Patient is due for labs and an appointment before running out.  Lab orders have been placed in the system.    Thank you

## 2025-04-30 DIAGNOSIS — R73.9 HYPERGLYCEMIA: ICD-10-CM

## 2025-04-30 RX ORDER — METFORMIN HYDROCHLORIDE 500 MG/1
TABLET ORAL
Qty: 180 TABLET | Refills: 0 | Status: SHIPPED | OUTPATIENT
Start: 2025-04-30

## 2025-04-30 NOTE — TELEPHONE ENCOUNTER
[facility] pharmacy requested a refill on Metformin  A 90 day supply has been sent to Dr Sands for approval.  Patient is due for fasting labs and an appointment before running out.  Lab orders have been placed in the system.    Thank you

## 2025-06-13 ENCOUNTER — APPOINTMENT (OUTPATIENT)
Dept: ENDOCRINOLOGY | Facility: CLINIC | Age: 55
End: 2025-06-13
Payer: COMMERCIAL

## 2025-06-23 DIAGNOSIS — E07.9 DISORDER OF THYROID: ICD-10-CM

## 2025-06-23 NOTE — TELEPHONE ENCOUNTER
Patient was last seen by Dr Sands 3/2024  Patient needs to have an appointment scheduled before sending in refills.  Labs are needed before appointment please.  Lab orders have been placed.    You can send this back to me to send in refills after appointment has been scheduled.

## 2025-06-24 RX ORDER — LEVOTHYROXINE SODIUM 125 UG/1
125 TABLET ORAL DAILY
Qty: 90 TABLET | Refills: 0 | OUTPATIENT
Start: 2025-06-24

## 2025-07-09 ENCOUNTER — PATIENT MESSAGE (OUTPATIENT)
Dept: PRIMARY CARE | Facility: CLINIC | Age: 55
End: 2025-07-09
Payer: COMMERCIAL

## 2025-07-11 DIAGNOSIS — E07.9 DISORDER OF THYROID: ICD-10-CM

## 2025-07-11 RX ORDER — LEVOTHYROXINE SODIUM 125 UG/1
125 TABLET ORAL DAILY
Qty: 45 TABLET | Refills: 0 | Status: SHIPPED | OUTPATIENT
Start: 2025-07-11